# Patient Record
Sex: FEMALE | Race: WHITE | NOT HISPANIC OR LATINO | Employment: FULL TIME | URBAN - METROPOLITAN AREA
[De-identification: names, ages, dates, MRNs, and addresses within clinical notes are randomized per-mention and may not be internally consistent; named-entity substitution may affect disease eponyms.]

---

## 2022-05-05 ENCOUNTER — HOSPITAL ENCOUNTER (EMERGENCY)
Facility: HOSPITAL | Age: 27
Discharge: HOME | End: 2022-05-05
Attending: EMERGENCY MEDICINE | Admitting: EMERGENCY MEDICINE
Payer: COMMERCIAL

## 2022-05-05 VITALS
SYSTOLIC BLOOD PRESSURE: 121 MMHG | BODY MASS INDEX: 23.05 KG/M2 | HEART RATE: 79 BPM | HEIGHT: 64 IN | OXYGEN SATURATION: 97 % | RESPIRATION RATE: 16 BRPM | TEMPERATURE: 98.3 F | DIASTOLIC BLOOD PRESSURE: 70 MMHG | WEIGHT: 135 LBS

## 2022-05-05 DIAGNOSIS — N73.9 PID (PELVIC INFLAMMATORY DISEASE): Primary | ICD-10-CM

## 2022-05-05 LAB
BILIRUB UR QL STRIP: NEGATIVE
CLARITY UR: CLEAR
COLOR UR: YELLOW
EXT PREG TEST URINE: NEGATIVE
EXT. CONTROL ED NAV: NORMAL
GLUCOSE UR STRIP-MCNC: NEGATIVE MG/DL
HGB UR QL STRIP.AUTO: NEGATIVE
HIV 1+2 AB+HIV1 P24 AG SERPL QL IA: NORMAL
HIV1 P24 AG SER QL: NORMAL
KETONES UR STRIP-MCNC: NEGATIVE MG/DL
LEUKOCYTE ESTERASE UR QL STRIP: NEGATIVE
NITRITE UR QL STRIP: NEGATIVE
PH UR STRIP.AUTO: 7 [PH]
PROT UR STRIP-MCNC: NEGATIVE MG/DL
RPR SER QL: NORMAL
SP GR UR STRIP.AUTO: 1.02 (ref 1–1.03)
UROBILINOGEN UR QL STRIP.AUTO: 0.2 E.U./DL

## 2022-05-05 PROCEDURE — 86592 SYPHILIS TEST NON-TREP QUAL: CPT | Performed by: EMERGENCY MEDICINE

## 2022-05-05 PROCEDURE — 96372 THER/PROPH/DIAG INJ SC/IM: CPT

## 2022-05-05 PROCEDURE — 87806 HIV AG W/HIV1&2 ANTB W/OPTIC: CPT | Performed by: EMERGENCY MEDICINE

## 2022-05-05 PROCEDURE — 99283 EMERGENCY DEPT VISIT LOW MDM: CPT

## 2022-05-05 PROCEDURE — 36415 COLL VENOUS BLD VENIPUNCTURE: CPT | Performed by: EMERGENCY MEDICINE

## 2022-05-05 PROCEDURE — 81025 URINE PREGNANCY TEST: CPT | Performed by: EMERGENCY MEDICINE

## 2022-05-05 PROCEDURE — 99284 EMERGENCY DEPT VISIT MOD MDM: CPT | Performed by: EMERGENCY MEDICINE

## 2022-05-05 PROCEDURE — 81003 URINALYSIS AUTO W/O SCOPE: CPT | Performed by: EMERGENCY MEDICINE

## 2022-05-05 PROCEDURE — 87591 N.GONORRHOEAE DNA AMP PROB: CPT | Performed by: EMERGENCY MEDICINE

## 2022-05-05 PROCEDURE — 87491 CHLMYD TRACH DNA AMP PROBE: CPT | Performed by: EMERGENCY MEDICINE

## 2022-05-05 RX ORDER — METRONIDAZOLE 500 MG/1
500 TABLET ORAL ONCE
Status: COMPLETED | OUTPATIENT
Start: 2022-05-05 | End: 2022-05-05

## 2022-05-05 RX ORDER — METRONIDAZOLE 500 MG/1
500 TABLET ORAL EVERY 12 HOURS SCHEDULED
Qty: 28 TABLET | Refills: 0 | Status: SHIPPED | OUTPATIENT
Start: 2022-05-05 | End: 2022-05-05 | Stop reason: SDUPTHER

## 2022-05-05 RX ORDER — METRONIDAZOLE 500 MG/1
500 TABLET ORAL EVERY 12 HOURS SCHEDULED
Qty: 28 TABLET | Refills: 0 | Status: SHIPPED | OUTPATIENT
Start: 2022-05-05 | End: 2022-05-19

## 2022-05-05 RX ORDER — DOXYCYCLINE HYCLATE 100 MG/1
100 CAPSULE ORAL ONCE
Status: COMPLETED | OUTPATIENT
Start: 2022-05-05 | End: 2022-05-05

## 2022-05-05 RX ORDER — DOXYCYCLINE HYCLATE 100 MG/1
100 CAPSULE ORAL 2 TIMES DAILY
Qty: 28 CAPSULE | Refills: 0 | Status: SHIPPED | OUTPATIENT
Start: 2022-05-05 | End: 2022-05-19

## 2022-05-05 RX ORDER — ONDANSETRON 4 MG/1
4 TABLET, ORALLY DISINTEGRATING ORAL ONCE
Status: COMPLETED | OUTPATIENT
Start: 2022-05-05 | End: 2022-05-05

## 2022-05-05 RX ADMIN — ONDANSETRON 4 MG: 4 TABLET, ORALLY DISINTEGRATING ORAL at 01:08

## 2022-05-05 RX ADMIN — METRONIDAZOLE 500 MG: 500 TABLET ORAL at 01:08

## 2022-05-05 RX ADMIN — LIDOCAINE HYDROCHLORIDE 250 MG: 10 INJECTION, SOLUTION EPIDURAL; INFILTRATION; INTRACAUDAL; PERINEURAL at 01:08

## 2022-05-05 RX ADMIN — DOXYCYCLINE 100 MG: 100 CAPSULE ORAL at 01:08

## 2022-05-05 NOTE — DISCHARGE INSTRUCTIONS
No intercourse until testing is resulted and negative  If positive, you should notify your sexual partners within the last 60 days

## 2022-05-05 NOTE — ED PROVIDER NOTES
History  Chief Complaint   Patient presents with    Vaginal Pain     Pt arrived to ED from home with pain in her cervix, "shooting down"  Slept with 1 of 2 partners approx 18      30 yo F, sexually active with multiple partners, does not use protection presents to ED with vaginal pain that started after intercourse tonight  Has had in past, milder, not like this  No h/o STD in past, has had BV in past  No gyn currently  No fevers/chills/N/V  No abd pain  No vaginal bleeding or discharge or rashes that she knows of  [place] was penile penetration only, and not rough, no injury during intercourse that she knows of  No urinary complaints  History provided by:  Patient and medical records   used: No    Vaginal Pain  Severity:  Moderate  Onset quality:  Sudden  Timing:  Constant  Progression:  Unchanged  Chronicity:  Recurrent  Associated symptoms: no abdominal pain, no chest pain, no congestion, no cough, no diarrhea, no ear pain, no fatigue, no fever, no headaches, no loss of consciousness, no myalgias, no nausea, no rash, no rhinorrhea, no shortness of breath, no sore throat, no vomiting and no wheezing        None       Past Medical History:   Diagnosis Date    ADHD     Depression     Psychiatric disorder        Past Surgical History:   Procedure Laterality Date    BREAST MASS EXCISION Left 2015    FOOT SURGERY Bilateral        History reviewed  No pertinent family history  I have reviewed and agree with the history as documented      E-Cigarette/Vaping    E-Cigarette Use Current Every Day User      E-Cigarette/Vaping Substances    Nicotine Yes     THC No     CBD No     Flavoring No     Other No     Unknown No      Social History     Tobacco Use    Smoking status: Never Smoker    Smokeless tobacco: Never Used   Vaping Use    Vaping Use: Every day    Substances: Nicotine   Substance Use Topics    Alcohol use: Not Currently    Drug use: Never       Review of Systems Constitutional: Negative for appetite change, chills, fatigue and fever  HENT: Negative for congestion, ear pain, rhinorrhea, sore throat, trouble swallowing and voice change  Eyes: Negative for pain and visual disturbance  Respiratory: Negative for cough, chest tightness, shortness of breath and wheezing  Cardiovascular: Negative for chest pain, palpitations and leg swelling  Gastrointestinal: Negative for abdominal pain, blood in stool, constipation, diarrhea, nausea and vomiting  Genitourinary: Positive for vaginal pain  Negative for difficulty urinating, dysuria, flank pain, hematuria, vaginal bleeding and vaginal discharge  Musculoskeletal: Negative for back pain, myalgias, neck pain and neck stiffness  Skin: Negative for rash  Neurological: Negative for dizziness, loss of consciousness, syncope, speech difficulty, light-headedness and headaches  Psychiatric/Behavioral: Negative for confusion and suicidal ideas  Physical Exam  Physical Exam  Vitals and nursing note reviewed  Exam conducted with a chaperone present (nurse [facility])  Constitutional:       General: She is not in acute distress  Appearance: She is well-developed  She is not diaphoretic  HENT:      Head: Normocephalic and atraumatic  Right Ear: External ear normal       Left Ear: External ear normal       Nose: Nose normal    Eyes:      General: No scleral icterus  Right eye: No discharge  Left eye: No discharge  Conjunctiva/sclera: Conjunctivae normal       Pupils: Pupils are equal, round, and reactive to light  Neck:      Trachea: No tracheal deviation  Cardiovascular:      Rate and Rhythm: Normal rate and regular rhythm  Heart sounds: Normal heart sounds  No murmur heard  No friction rub  No gallop  Pulmonary:      Effort: Pulmonary effort is normal  No respiratory distress  Breath sounds: Normal breath sounds  No stridor  Chest:      Chest wall: No tenderness  Abdominal:      General: Bowel sounds are normal       Palpations: Abdomen is soft  Tenderness: There is no abdominal tenderness  There is no guarding or rebound  Genitourinary:     Comments: + CMT, with strawberry cervix  Scant yellow discharge  No lesions on vulva  No bleeding  No uterine/adnexal tenderness  Musculoskeletal:         General: No deformity  Normal range of motion  Cervical back: Normal range of motion and neck supple  Lymphadenopathy:      Cervical: No cervical adenopathy  Skin:     General: Skin is warm and dry  Findings: No rash  Neurological:      Mental Status: She is alert and oriented to person, place, and time  Cranial Nerves: No cranial nerve deficit  Sensory: No sensory deficit  Coordination: Coordination normal    Psychiatric:         Behavior: Behavior normal          Vital Signs  ED Triage Vitals [05/05/22 0048]   Temperature Pulse Respirations Blood Pressure SpO2   98 3 °F (36 8 °C) 96 16 121/70 97 %      Temp Source Heart Rate Source Patient Position - Orthostatic VS BP Location FiO2 (%)   Oral Monitor Sitting Right arm --      Pain Score       5           Vitals:    05/05/22 0048 05/05/22 0100   BP: 121/70 121/70   Pulse: 96 79   Patient Position - Orthostatic VS: Sitting          Visual Acuity      ED Medications  Medications   ondansetron (ZOFRAN-ODT) dispersible tablet 4 mg (4 mg Oral Given 5/5/22 0108)   cefTRIAXone (ROCEPHIN) 250 mg in lidocaine (PF) (XYLOCAINE-MPF) 1 % IM only syringe (250 mg Intramuscular Given 5/5/22 0108)   doxycycline hyclate (VIBRAMYCIN) capsule 100 mg (100 mg Oral Given 5/5/22 0108)   metroNIDAZOLE (FLAGYL) tablet 500 mg (500 mg Oral Given 5/5/22 0108)       Diagnostic Studies  Results Reviewed     Procedure Component Value Units Date/Time    Rapid HIV 1/2 AB-AG Combo [[id number]] Collected: 05/05/22 0116    Lab Status:  In process Specimen: Blood from Arm, Right Updated: 05/05/22 0122    RPR [[id number]] Collected: 05/05/22 0116    Lab Status: In process Specimen: Blood from Arm, Right Updated: 05/05/22 0122    UA w Reflex to Microscopic w Reflex to Culture [[id number]] Collected: 05/05/22 0049    Lab Status: Final result Specimen: Urine, Clean Catch Updated: 05/05/22 0058     Color, UA Yellow     Clarity, UA Clear     Specific Gravity, UA 1 025     pH, UA 7 0     Leukocytes, UA Negative     Nitrite, UA Negative     Protein, UA Negative mg/dl      Glucose, UA Negative mg/dl      Ketones, UA Negative mg/dl      Urobilinogen, UA 0 2 E U /dl      Bilirubin, UA Negative     Blood, UA Negative    Chlamydia/GC amplified DNA by PCR [[id number]] Collected: 05/05/22 0049    Lab Status: In process Specimen: Urine, Other Updated: 05/05/22 0054    POCT pregnancy, urine [[id number]]  (Normal) Resulted: 05/05/22 0053    Lab Status: Final result Updated: 05/05/22 0053     EXT PREG TEST UR (Ref: Negative) negative     Control valid                 No orders to display              Procedures  Procedures         ED Course                               SBIRT 22yo+      Most Recent Value   SBIRT (24 yo +)    In order to provide better care to our patients, we are screening all of our patients for alcohol and drug use  Would it be okay to ask you these screening questions? Yes Filed at: 05/05/2022 0054   Initial Alcohol Screen: US AUDIT-C     1  How often do you have a drink containing alcohol? 0 Filed at: 05/05/2022 0054   2  How many drinks containing alcohol do you have on a typical day you are drinking? 0 Filed at: 05/05/2022 0054   3b  FEMALE Any Age, or MALE 65+: How often do you have 4 or more drinks on one occassion? 0 Filed at: 05/05/2022 0054   Audit-C Score 0 Filed at: 05/05/2022 1555   YESSENIA: How many times in the past year have you    Used an illegal drug or used a prescription medication for non-medical reasons?  Never Filed at: 05/05/2022 0054                    MDM  Number of Diagnoses or Management Options  PID (pelvic inflammatory disease)  Diagnosis management comments: Will treat for presumed PID  Discussed safe sex and gyn f/u  RTED instructions given  STD cultures pend at time of d/c  Amount and/or Complexity of Data Reviewed  Clinical lab tests: ordered  Review and summarize past medical records: yes    Risk of Complications, Morbidity, and/or Mortality  Presenting problems: low  Diagnostic procedures: low  Management options: low    Patient Progress  Patient progress: stable      Disposition  Final diagnoses:   PID (pelvic inflammatory disease)     Time reflects when diagnosis was documented in both MDM as applicable and the Disposition within this note     Time User Action Codes Description Comment    5/5/2022  1:18 AM Kelly Soria [N73 [id number]] PID (pelvic inflammatory disease)       ED Disposition     ED Disposition Condition Date/Time Comment    Discharge Stable Thu May 5, 2022  1:18 AM Miley Steve discharge to home/self care              Follow-up Information     Follow up With Specialties Details Why Contact Info Additional Information     [address] Emergency Department Emergency Medicine  If symptoms worsen [address] [place], [id number]  [address] [facility] Emergency Department, [address] and Gynecology Schedule an appointment as soon as possible for a visit   [address] Obstetrics and Gynecology Call   [address] [facility] [id number] [place] [id number]  [address], [address], [phone]          Discharge Medication List as of 5/5/2022  1:21 AM      START taking these medications    Details   doxycycline hyclate (VIBRAMYCIN) 100 mg capsule Take 1 capsule (100 mg total) by mouth 2 (two) times a day for 14 days, Starting u 5/5/2022, Until u 5/19/2022, Print      metroNIDAZOLE (FLAGYL) 500 mg tablet Take 1 tablet (500 mg total) by mouth every 12 (twelve) hours for 14 days, Starting Thu 5/5/2022, Until u 5/19/2022, Print             No discharge procedures on file      PDMP Review     None          ED Provider  Electronically Signed by           Sherlie Lesches, MD  05/05/22 2721

## 2022-05-07 LAB
C TRACH DNA SPEC QL NAA+PROBE: NEGATIVE
N GONORRHOEA DNA SPEC QL NAA+PROBE: NEGATIVE

## 2024-11-15 ENCOUNTER — TELEPHONE (OUTPATIENT)
Age: 29
End: 2024-11-15

## 2024-11-15 NOTE — TELEPHONE ENCOUNTER
Wait List - MM    Cherry states she has no office preference. She will take [place], [place] or [place].    No Provider Preference

## 2025-01-16 ENCOUNTER — TELEPHONE (OUTPATIENT)
Age: 30
End: 2025-01-16

## 2025-01-24 ENCOUNTER — HOSPITAL ENCOUNTER (OUTPATIENT)
Dept: RADIOLOGY | Facility: HOSPITAL | Age: 30
End: 2025-01-24
Payer: COMMERCIAL

## 2025-01-24 DIAGNOSIS — M79.671 RIGHT FOOT PAIN: ICD-10-CM

## 2025-01-24 PROCEDURE — 73630 X-RAY EXAM OF FOOT: CPT

## 2025-02-07 NOTE — TELEPHONE ENCOUNTER
"Behavioral Health Outpatient Intake Questions    Referred By   : Self    Please advise interviewee that they need to answer all questions truthfully to allow for best care, and any misrepresentations of information may affect their ability to be seen at this clinic   => Was this discussed? Yes     If Minor Child (under age 18)    Who is/are the legal guardian(s) of the child?     Is there a custody agreement? No     If \"YES\"- Custody orders must be obtained prior to scheduling the first appointment  In addition, Consent to Treatment must be signed by all legal guardians prior to scheduling the first appointment    If \"NO\"- Consent to Treatment must be signed by all legal guardians prior to scheduling the first appointment    Behavioral Health Outpatient Intake History -     Presenting Problem (in patient's own words):     Severe depression and anxiety     Are there any communication barriers for this patient?     Yes                                               If yes, please describe barriers: ADHD  If there is a unique situation, please refer to Stef Westbrook/Nakia Arguello for final determination.    Are you taking any psychiatric medications? Yes     If \"YES\" -What are they Adderall      If \"YES\" -Who prescribes? PCP    Has the Patient previously received outpatient Talk Therapy or Medication Management from [facility]  NO       If \"YES\"- When, Where and with Whom?         If \"NO\" -Has Patient received these services elsewhere?       If \"YES\" -When, Where, and with Whom?    Has the Patient abused alcohol or other substances in the last 6 months ? No  No concerns of substance abuse are reported.     If \"YES\" -What substance, How much, How often?     If illegal substance: Refer to [facility] (for TE[facility]) or [facility].   If Alcohol in excess of 10 drinks per week:  Refer to [facility] (for [facility]) or [facility]    Legal History-     Is this treatment court ordered? No   If \"yes \"send to :  Talk " "Therapy : Send to Stef Westbrook for final determination   Med Management: Send to Dr. Lockwood for final determination     Has the Patient been convicted of a felony?  No   If \"Yes\" send to -When, What?  Talk Therapy: Send to Stef Westbrook for final determination   Med Management: Send to Dr. Lockwood for final determination     ACCEPTED as a patient Yes  If \"Yes\" Appointment Date: 4/3/2025    Referred Elsewhere? No  If “Yes” - (Where? Ex: [facility], [facility], [facility], [facility], etc.)       Name of Insurance Co:[employer]   Insurance ID#[id number]  Insurance Phone #  If ins is primary or secondary?Primary   If patient is a minor, parents information such as Name, D.O.B of guarantor.    "

## 2025-02-13 ENCOUNTER — TELEPHONE (OUTPATIENT)
Dept: PSYCHIATRY | Facility: CLINIC | Age: 30
End: 2025-02-13

## 2025-02-25 ENCOUNTER — APPOINTMENT (OUTPATIENT)
Dept: RADIOLOGY | Facility: CLINIC | Age: 30
End: 2025-02-25
Payer: OTHER MISCELLANEOUS

## 2025-02-25 ENCOUNTER — OCCMED (OUTPATIENT)
Dept: URGENT CARE | Facility: CLINIC | Age: 30
End: 2025-02-25
Payer: OTHER MISCELLANEOUS

## 2025-02-25 DIAGNOSIS — S99.911A ANKLE INJURY, RIGHT, INITIAL ENCOUNTER: ICD-10-CM

## 2025-02-25 DIAGNOSIS — S99.911A ANKLE INJURY, RIGHT, INITIAL ENCOUNTER: Primary | ICD-10-CM

## 2025-02-25 PROCEDURE — G0382 LEV 3 HOSP TYPE B ED VISIT: HCPCS

## 2025-02-25 PROCEDURE — 99283 EMERGENCY DEPT VISIT LOW MDM: CPT

## 2025-02-25 PROCEDURE — 73610 X-RAY EXAM OF ANKLE: CPT

## 2025-03-26 ENCOUNTER — APPOINTMENT (OUTPATIENT)
Dept: RADIOLOGY | Facility: HOSPITAL | Age: 30
End: 2025-03-26
Payer: COMMERCIAL

## 2025-03-26 ENCOUNTER — HOSPITAL ENCOUNTER (EMERGENCY)
Facility: HOSPITAL | Age: 30
Discharge: HOME | End: 2025-03-26
Attending: EMERGENCY MEDICINE
Payer: COMMERCIAL

## 2025-03-26 VITALS
HEIGHT: 65 IN | HEART RATE: 98 BPM | BODY MASS INDEX: 28.32 KG/M2 | RESPIRATION RATE: 18 BRPM | WEIGHT: 170 LBS | OXYGEN SATURATION: 100 % | SYSTOLIC BLOOD PRESSURE: 131 MMHG | TEMPERATURE: 98.3 F | DIASTOLIC BLOOD PRESSURE: 80 MMHG

## 2025-03-26 DIAGNOSIS — H66.91 RIGHT OTITIS MEDIA: Primary | ICD-10-CM

## 2025-03-26 LAB
FLUAV RNA RESP QL NAA+PROBE: NEGATIVE
FLUBV RNA RESP QL NAA+PROBE: NEGATIVE
RSV RNA RESP QL NAA+PROBE: NEGATIVE
SARS-COV-2 RNA RESP QL NAA+PROBE: NEGATIVE

## 2025-03-26 PROCEDURE — 99282 EMERGENCY DEPT VISIT SF MDM: CPT

## 2025-03-26 PROCEDURE — 99284 EMERGENCY DEPT VISIT MOD MDM: CPT | Performed by: EMERGENCY MEDICINE

## 2025-03-26 PROCEDURE — 0241U HB NFCT DS VIR RESP RNA 4 TRGT: CPT

## 2025-03-26 RX ORDER — AMOXICILLIN 500 MG/1
500 CAPSULE ORAL EVERY 12 HOURS SCHEDULED
Qty: 14 CAPSULE | Refills: 0 | Status: SHIPPED | OUTPATIENT
Start: 2025-03-26 | End: 2025-04-02

## 2025-03-26 RX ORDER — AMOXICILLIN 250 MG/1
500 CAPSULE ORAL ONCE
Status: COMPLETED | OUTPATIENT
Start: 2025-03-26 | End: 2025-03-26

## 2025-03-26 RX ORDER — OXYMETAZOLINE HYDROCHLORIDE 0.05 G/100ML
2 SPRAY NASAL ONCE
Status: COMPLETED | OUTPATIENT
Start: 2025-03-26 | End: 2025-03-26

## 2025-03-26 RX ADMIN — OXYMETAZOLINE HYDROCHLORIDE 2 SPRAY: 0.05 SPRAY NASAL at 22:34

## 2025-03-26 RX ADMIN — AMOXICILLIN 500 MG: 250 CAPSULE ORAL at 22:33

## 2025-03-27 NOTE — DISCHARGE INSTRUCTIONS
You have been seen for right ear pain. Please complete the course of amoxicillin as prescribed. Return to the emergency department if you develop worsening pain, headaches, fevers or any other symptoms of concern. Please follow up with a PCP and ENT by calling the number provided.

## 2025-03-27 NOTE — ED PROVIDER NOTES
Time reflects when diagnosis was documented in both MDM as applicable and the Disposition within this note       Time User Action Codes Description Comment    3/26/2025 10:32 PM Denver Jones Add [H66.91] Right otitis media           ED Disposition       ED Disposition   Discharge    Condition   Stable    Date/Time   Wed Mar 26, 2025 10:29 PM    Comment   Cherry Oakes discharge to home/self care.                   Assessment & Plan       Medical Decision Making    29 y.o. female presenting for right ear pain.  VSS, afebrile.  No s/s dental or neck space abscess on exam,  Right TM erythematous and bulging, suspect AOM. No mastoid tenderness, do not suspect mastoiditis.  Will treat with afrin nasal spray and course of amoxicillin.    I have discussed with the patient our plan to discharge them from the ED and the patient is in agreement with this plan. The patient was provided a written after visit summary with strict RTED precautions.     Followup: I have discussed with the patient plan to follow up with a PCP. Will provide info for evaluation by ENT as well should symptoms fail to resolve. Contact information provided in AVS.    Risk  OTC drugs.  Prescription drug management.             Medications   amoxicillin (AMOXIL) capsule 500 mg (500 mg Oral Given 3/26/25 2233)   oxymetazoline (AFRIN) 0.05 % nasal spray 2 spray (2 sprays Each Nare Given 3/26/25 2234)       ED Risk Strat Scores                                                History of Present Illness       Chief Complaint   Patient presents with    Earache     Pt states that for the past 12 days she has been having right ear pain the travels into the neck and head and jaw area. Pt took motrin at noon       Past Medical History:   Diagnosis Date    ADHD     Depression     Psychiatric disorder       Past Surgical History:   Procedure Laterality Date    BREAST MASS EXCISION Left 2015    FOOT SURGERY Bilateral       History reviewed. No pertinent family  history.   Social History     Tobacco Use    Smoking status: Never    Smokeless tobacco: Current   Vaping Use    Vaping status: Every Day    Substances: Nicotine   Substance Use Topics    Alcohol use: Not Currently    Drug use: Never      E-Cigarette/Vaping    E-Cigarette Use Current Every Day User       E-Cigarette/Vaping Substances    Nicotine Yes     THC No     CBD No     Flavoring No     Other No     Unknown No       I have reviewed and agree with the history as documented.     Cherry Oakes is a 29 y.o. year old female presenting to the [facility] ED for right ear pain. Patient reporting two weeks of constant right ear discomfort. She occasionally has a pressure sensation in her right ear which radiates into her right and left jaw. She also feels a pressure sensation in her head.  No fevers, cough or congestion. No neck stiffness or swelling. No double vision or loss of vision. No chest pain or dyspnea.  She was seen by her dentist who did not see any abnormalities to explain her symptoms.  She has taken motrin/naproxen without relief. She does not take OCP medications.      History provided by:  Medical records and patient   used: No    Earache  Associated symptoms: headaches    Associated symptoms: no abdominal pain, no congestion, no cough, no fever, no neck pain, no rash, no rhinorrhea, no sore throat and no vomiting        Review of Systems   Constitutional:  Negative for chills and fever.   HENT:  Positive for dental problem and ear pain. Negative for congestion, drooling, rhinorrhea, sinus pressure, sore throat, trouble swallowing and voice change.    Eyes:  Negative for photophobia and visual disturbance.   Respiratory:  Negative for cough and shortness of breath.    Cardiovascular:  Negative for chest pain.   Gastrointestinal:  Negative for abdominal pain, nausea and vomiting.   Musculoskeletal:  Negative for neck pain and neck stiffness.   Skin:  Negative for rash.   Neurological:   Positive for headaches. Negative for weakness and numbness.   All other systems reviewed and are negative.          Objective       ED Triage Vitals [03/26/25 2201]   Temperature Pulse Blood Pressure Respirations SpO2 Patient Position - Orthostatic VS   98.3 °F (36.8 °C) 98 131/80 18 100 % --      Temp src Heart Rate Source BP Location FiO2 (%) Pain Score    -- Monitor -- -- --      Vitals      Date and Time Temp Pulse SpO2 Resp BP Pain Score FACES Pain Rating User   03/26/25 2201 98.3 °F (36.8 °C) 98 100 % 18 131/80 -- -- LD            Physical Exam  Vitals and nursing note reviewed.   Constitutional:       General: She is not in acute distress.     Appearance: Normal appearance. She is well-developed. She is not ill-appearing, toxic-appearing or diaphoretic.   HENT:      Head: Normocephalic and atraumatic.      Right Ear: Ear canal and external ear normal. No drainage or tenderness. A middle ear effusion is present. There is no impacted cerumen. No foreign body. No mastoid tenderness. Tympanic membrane is erythematous, retracted and bulging.      Left Ear: Tympanic membrane, ear canal and external ear normal.      Nose: No congestion or rhinorrhea.      Mouth/Throat:      Pharynx: Uvula midline. No pharyngeal swelling, oropharyngeal exudate, posterior oropharyngeal erythema or uvula swelling.      Tonsils: No tonsillar exudate or tonsillar abscesses.   Eyes:      General:         Right eye: No discharge.         Left eye: No discharge.   Cardiovascular:      Rate and Rhythm: Normal rate and regular rhythm.   Pulmonary:      Effort: Pulmonary effort is normal. No accessory muscle usage or respiratory distress.      Breath sounds: Normal breath sounds. No stridor. No decreased breath sounds, wheezing, rhonchi or rales.   Abdominal:      General: There is no distension.      Palpations: Abdomen is soft.      Tenderness: There is no abdominal tenderness. There is no guarding or rebound.   Musculoskeletal:       Cervical back: Normal range of motion and neck supple. No rigidity or tenderness.      Right lower leg: No tenderness.      Left lower leg: No tenderness.   Skin:     Capillary Refill: Capillary refill takes less than 2 seconds.   Neurological:      Mental Status: She is alert and oriented to person, place, and time.      GCS: GCS eye subscore is 4. GCS verbal subscore is 5. GCS motor subscore is 6.      Cranial Nerves: No dysarthria or facial asymmetry.      Sensory: Sensation is intact.      Motor: Motor function is intact.      Comments: 5/5 strength b/l UE/LE.  Sensation grossly intact throughout.     Psychiatric:         Mood and Affect: Mood normal.         Behavior: Behavior normal.         Results Reviewed       Procedure Component Value Units Date/Time    COVID/FLU/RSV [[id number]]  (Normal) Collected: 03/26/25 2205    Lab Status: Final result Specimen: Nares from Nose Updated: 03/26/25 2246     SARS-CoV-2 Negative     INFLUENZA A PCR Negative     INFLUENZA B PCR Negative     RSV PCR Negative    Narrative:      This test has been performed using the CoV-2/Flu/RSV plus assay on the [employer] GeneXpert platform. This test has been validated by the  and verified by the performing laboratory.     This test is designed to amplify and detect the following: nucleocapsid (N), envelope (E), and RNA-dependent RNA polymerase (RdRP) genes of the SARS-CoV-2 genome; matrix (M), basic polymerase (PB2), and acidic protein (PA) segments of the influenza A genome; matrix (M) and non-structural protein (NS) segments of the influenza B genome, and the nucleocapsid genes of RSV A and RSV B.     Positive results are indicative of the presence of Flu A, Flu B, RSV, and/or SARS-CoV-2 RNA. Positive results for SARS-CoV-2 or suspected novel influenza should be reported to state, local, or federal health departments according to local reporting requirements.      All results should be assessed in conjunction with clinical  presentation and other laboratory markers for clinical management.     FOR PEDIATRIC PATIENTS - copy/paste COVID Guidelines URL to browser: [email or web address]               No orders to display       Procedures    ED Medication and Procedure Management   None     Discharge Medication List as of 3/26/2025 10:34 PM        START taking these medications    Details   amoxicillin (AMOXIL) 500 mg capsule Take 1 capsule (500 mg total) by mouth every 12 (twelve) hours for 7 days, Starting Wed 3/26/2025, Until Wed 4/2/2025, Normal             ED SEPSIS DOCUMENTATION   Time reflects when diagnosis was documented in both MDM as applicable and the Disposition within this note       Time User Action Codes Description Comment    3/26/2025 10:32 PM Denver Jones Add [H66.91] Right otitis media                  Denver Jones, DO  03/26/25 2335       Denver Jones, DO  03/26/25 2313

## 2025-04-03 ENCOUNTER — TELEPHONE (OUTPATIENT)
Dept: PSYCHIATRY | Facility: CLINIC | Age: 30
End: 2025-04-03

## 2025-04-03 NOTE — TELEPHONE ENCOUNTER
LVM that client still needs to sign several forms in order to have her virtual NP appt at 3PM today. These forms include the Insurance Auth, Financial Policy, NP Forms, and the Virtual Care Behavioral Health Consent. Made client aware that these forms are required to procced with her appt.

## 2025-06-13 ENCOUNTER — APPOINTMENT (EMERGENCY)
Dept: ULTRASOUND IMAGING | Facility: HOSPITAL | Age: 30
End: 2025-06-13
Payer: COMMERCIAL

## 2025-06-13 ENCOUNTER — HOSPITAL ENCOUNTER (EMERGENCY)
Facility: HOSPITAL | Age: 30
Discharge: HOME | End: 2025-06-13
Attending: EMERGENCY MEDICINE
Payer: COMMERCIAL

## 2025-06-13 VITALS
TEMPERATURE: 97.8 F | WEIGHT: 180 LBS | RESPIRATION RATE: 18 BRPM | OXYGEN SATURATION: 100 % | HEART RATE: 94 BPM | BODY MASS INDEX: 29.99 KG/M2 | SYSTOLIC BLOOD PRESSURE: 100 MMHG | HEIGHT: 65 IN | DIASTOLIC BLOOD PRESSURE: 63 MMHG

## 2025-06-13 DIAGNOSIS — N94.6 DYSMENORRHEA: Primary | ICD-10-CM

## 2025-06-13 LAB
ALBUMIN SERPL BCG-MCNC: 4.5 G/DL (ref 3.5–5)
ALP SERPL-CCNC: 39 U/L (ref 34–104)
ALT SERPL W P-5'-P-CCNC: 20 U/L (ref 7–52)
ANION GAP SERPL CALCULATED.3IONS-SCNC: 4 MMOL/L (ref 4–13)
AST SERPL W P-5'-P-CCNC: 15 U/L (ref 13–39)
B-HCG SERPL-ACNC: <0.6 MIU/ML (ref 0–5)
BACTERIA UR QL AUTO: ABNORMAL /HPF
BASOPHILS # BLD AUTO: 0.06 THOUSANDS/ÂΜL (ref 0–0.1)
BASOPHILS NFR BLD AUTO: 1 % (ref 0–1)
BILIRUB SERPL-MCNC: 0.33 MG/DL (ref 0.2–1)
BILIRUB UR QL STRIP: NEGATIVE
BUN SERPL-MCNC: 15 MG/DL (ref 5–25)
CALCIUM SERPL-MCNC: 9.3 MG/DL (ref 8.4–10.2)
CHLORIDE SERPL-SCNC: 106 MMOL/L (ref 96–108)
CLARITY UR: ABNORMAL
CO2 SERPL-SCNC: 26 MMOL/L (ref 21–32)
COLOR UR: YELLOW
CREAT SERPL-MCNC: 0.6 MG/DL (ref 0.6–1.3)
EOSINOPHIL # BLD AUTO: 0.08 THOUSAND/ÂΜL (ref 0–0.61)
EOSINOPHIL NFR BLD AUTO: 1 % (ref 0–6)
ERYTHROCYTE [DISTWIDTH] IN BLOOD BY AUTOMATED COUNT: 11.9 % (ref 11.6–15.1)
GFR SERPL CREATININE-BSD FRML MDRD: 122 ML/MIN/1.73SQ M
GLUCOSE SERPL-MCNC: 103 MG/DL (ref 65–140)
GLUCOSE UR STRIP-MCNC: NEGATIVE MG/DL
HCT VFR BLD AUTO: 39.9 % (ref 34.8–46.1)
HGB BLD-MCNC: 13.8 G/DL (ref 11.5–15.4)
HGB UR QL STRIP.AUTO: ABNORMAL
IMM GRANULOCYTES # BLD AUTO: 0.02 THOUSAND/UL (ref 0–0.2)
IMM GRANULOCYTES NFR BLD AUTO: 0 % (ref 0–2)
KETONES UR STRIP-MCNC: NEGATIVE MG/DL
LEUKOCYTE ESTERASE UR QL STRIP: ABNORMAL
LIPASE SERPL-CCNC: 16 U/L (ref 11–82)
LYMPHOCYTES # BLD AUTO: 2 THOUSANDS/ÂΜL (ref 0.6–4.47)
LYMPHOCYTES NFR BLD AUTO: 29 % (ref 14–44)
MCH RBC QN AUTO: 31.5 PG (ref 26.8–34.3)
MCHC RBC AUTO-ENTMCNC: 34.6 G/DL (ref 31.4–37.4)
MCV RBC AUTO: 91 FL (ref 82–98)
MONOCYTES # BLD AUTO: 0.41 THOUSAND/ÂΜL (ref 0.17–1.22)
MONOCYTES NFR BLD AUTO: 6 % (ref 4–12)
MUCOUS THREADS UR QL AUTO: ABNORMAL
NEUTROPHILS # BLD AUTO: 4.29 THOUSANDS/ÂΜL (ref 1.85–7.62)
NEUTS SEG NFR BLD AUTO: 63 % (ref 43–75)
NITRITE UR QL STRIP: NEGATIVE
NON-SQ EPI CELLS URNS QL MICRO: ABNORMAL /HPF
NRBC BLD AUTO-RTO: 0 /100 WBCS
PH UR STRIP.AUTO: 6 [PH]
PLATELET # BLD AUTO: 241 THOUSANDS/UL (ref 149–390)
PMV BLD AUTO: 10.2 FL (ref 8.9–12.7)
POTASSIUM SERPL-SCNC: 3.8 MMOL/L (ref 3.5–5.3)
PROT SERPL-MCNC: 6.9 G/DL (ref 6.4–8.4)
PROT UR STRIP-MCNC: ABNORMAL MG/DL
RBC # BLD AUTO: 4.38 MILLION/UL (ref 3.81–5.12)
RBC #/AREA URNS AUTO: ABNORMAL /HPF
SODIUM SERPL-SCNC: 136 MMOL/L (ref 135–147)
SP GR UR STRIP.AUTO: 1.02 (ref 1–1.03)
UROBILINOGEN UR STRIP-ACNC: <2 MG/DL
WBC # BLD AUTO: 6.86 THOUSAND/UL (ref 4.31–10.16)
WBC #/AREA URNS AUTO: ABNORMAL /HPF

## 2025-06-13 PROCEDURE — 96372 THER/PROPH/DIAG INJ SC/IM: CPT

## 2025-06-13 PROCEDURE — 99284 EMERGENCY DEPT VISIT MOD MDM: CPT

## 2025-06-13 PROCEDURE — 80053 COMPREHEN METABOLIC PANEL: CPT

## 2025-06-13 PROCEDURE — 76856 US EXAM PELVIC COMPLETE: CPT

## 2025-06-13 PROCEDURE — 76830 TRANSVAGINAL US NON-OB: CPT

## 2025-06-13 PROCEDURE — 99284 EMERGENCY DEPT VISIT MOD MDM: CPT | Performed by: PHYSICIAN ASSISTANT

## 2025-06-13 PROCEDURE — 83690 ASSAY OF LIPASE: CPT

## 2025-06-13 PROCEDURE — 81001 URINALYSIS AUTO W/SCOPE: CPT | Performed by: PHYSICIAN ASSISTANT

## 2025-06-13 PROCEDURE — 85025 COMPLETE CBC W/AUTO DIFF WBC: CPT

## 2025-06-13 PROCEDURE — 36415 COLL VENOUS BLD VENIPUNCTURE: CPT

## 2025-06-13 PROCEDURE — 84702 CHORIONIC GONADOTROPIN TEST: CPT

## 2025-06-13 RX ORDER — KETOROLAC TROMETHAMINE 30 MG/ML
30 INJECTION, SOLUTION INTRAMUSCULAR; INTRAVENOUS ONCE
Status: COMPLETED | OUTPATIENT
Start: 2025-06-13 | End: 2025-06-13

## 2025-06-13 RX ORDER — KETOROLAC TROMETHAMINE 30 MG/ML
15 INJECTION, SOLUTION INTRAMUSCULAR; INTRAVENOUS ONCE
Status: DISCONTINUED | OUTPATIENT
Start: 2025-06-13 | End: 2025-06-13

## 2025-06-13 RX ORDER — NAPROXEN 500 MG/1
500 TABLET ORAL 2 TIMES DAILY WITH MEALS
Qty: 30 TABLET | Refills: 0 | Status: SHIPPED | OUTPATIENT
Start: 2025-06-13

## 2025-06-13 RX ADMIN — KETOROLAC TROMETHAMINE 30 MG: 30 INJECTION, SOLUTION INTRAMUSCULAR; INTRAVENOUS at 14:22

## 2025-06-13 NOTE — ED PROVIDER NOTES
Time reflects when diagnosis was documented in both MDM as applicable and the Disposition within this note       Time User Action Codes Description Comment    6/13/2025  3:12 PM Brittany Penaloza Add [N94.6] Dysmenorrhea           ED Disposition       ED Disposition   Discharge    Condition   Stable    Date/Time   Fri Jun 13, 2025  3:12 PM    Comment   Cherry Oakes discharge to home/self care.                   Assessment & Plan       Medical Decision Making  Patient with lower abdominal cramping, currently has menses, will order labs, U/S to r/o anemia, ovarian cyst/torsion.  BHCG negative, no concern for ectopic.  No acute abnormalities on labs or u/s, patient's pain improved, will d/c with naprosyn and referral to ob/gyn.     Amount and/or Complexity of Data Reviewed  Radiology: ordered.    Risk  Prescription drug management.        ED Course as of 06/13/25 1531   Fri Jun 13, 2025   1520 Patient's pain improved with toradol.         Medications   ketorolac (TORADOL) injection 30 mg (30 mg Intramuscular Given 6/13/25 1422)       ED Risk Strat Scores                    No data recorded        SBIRT 20yo+      Flowsheet Row Most Recent Value   Initial Alcohol Screen: US AUDIT-C     1. How often do you have a drink containing alcohol? 0 Filed at: 06/13/2025 1426   2. How many drinks containing alcohol do you have on a typical day you are drinking?  0 Filed at: 06/13/2025 1426   3a. Male UNDER 65: How often do you have five or more drinks on one occasion? 0 Filed at: 06/13/2025 1426   3b. FEMALE Any Age, or MALE 65+: How often do you have 4 or more drinks on one occassion? 0 Filed at: 06/13/2025 1426   Audit-C Score 0 Filed at: 06/13/2025 1426   YESSENIA: How many times in the past year have you...    Used an illegal drug or used a prescription medication for non-medical reasons? Never Filed at: 06/13/2025 1426                            History of Present Illness       Chief Complaint   Patient presents with     Vaginal Bleeding     Pt states that she started her menstruation on Tuesday and is having lower mid/adb pain. Pt took midal at 10am this morning        Past Medical History[1]   Past Surgical History[2]   Family History[3]   Social History[4]   E-Cigarette/Vaping    E-Cigarette Use Current Every Day User       E-Cigarette/Vaping Substances    Nicotine Yes     THC No     CBD No     Flavoring No     Other No     Unknown No       I have reviewed and agree with the history as documented.     Patient is a 28 y/o F that presents to the ED with abdominal cramping.  She states her period started 4 days ago at normal time.  She states her cramping has worsened.  She took midol this morning, but it didn't help the pain.  She denies heavy vaginal bleeding and states her period has stopped.  No nausea, vomiting or diarrhea.       History provided by:  Patient  Vaginal Bleeding  Associated symptoms: abdominal pain    Associated symptoms: no back pain, no dizziness, no dysuria, no fever and no nausea        Review of Systems   Constitutional:  Negative for chills and fever.   HENT: Negative.     Respiratory:  Negative for cough and shortness of breath.    Cardiovascular:  Negative for chest pain.   Gastrointestinal:  Positive for abdominal pain. Negative for diarrhea, nausea and vomiting.   Genitourinary:  Positive for vaginal bleeding. Negative for dysuria and frequency.   Musculoskeletal:  Negative for back pain and neck pain.   Skin:  Negative for color change, pallor and rash.   Neurological:  Negative for dizziness, weakness, light-headedness and numbness.   Psychiatric/Behavioral:  Negative for confusion.    All other systems reviewed and are negative.          Objective       ED Triage Vitals [06/13/25 1344]   Temperature Pulse Blood Pressure Respirations SpO2 Patient Position - Orthostatic VS   97.8 °F (36.6 °C) 94 100/63 18 100 % --      Temp Source Heart Rate Source BP Location FiO2 (%) Pain Score    Temporal -- -- --  --      Vitals      Date and Time Temp Pulse SpO2 Resp BP Pain Score FACES Pain Rating User   06/13/25 1344 97.8 °F (36.6 °C) 94 100 % 18 100/63 -- -- LD            Physical Exam  Vitals and nursing note reviewed.   Constitutional:       General: She is in acute distress (patient appears to be in moderate amount of pain.).      Appearance: Normal appearance. She is well-developed and well-groomed. She is not ill-appearing or diaphoretic.   HENT:      Head: Normocephalic and atraumatic.      Right Ear: Hearing normal.      Left Ear: Hearing normal.      Nose: Nose normal.     Eyes:      Conjunctiva/sclera: Conjunctivae normal.       Cardiovascular:      Rate and Rhythm: Normal rate and regular rhythm.      Heart sounds: Normal heart sounds.   Pulmonary:      Effort: Pulmonary effort is normal.      Breath sounds: Normal breath sounds. No wheezing, rhonchi or rales.   Abdominal:      General: Abdomen is flat. Bowel sounds are normal.      Palpations: Abdomen is soft.      Tenderness: There is no abdominal tenderness.     Musculoskeletal:         General: Normal range of motion.      Right lower leg: No edema.      Left lower leg: No edema.     Skin:     General: Skin is warm and dry.      Coloration: Skin is not jaundiced or pale.      Findings: No rash.     Neurological:      General: No focal deficit present.      Mental Status: She is alert and oriented to person, place, and time.      Motor: No weakness.     Psychiatric:         Mood and Affect: Mood normal.         Behavior: Behavior is cooperative.         Results Reviewed       Procedure Component Value Units Date/Time    Urine Microscopic [[id number]]  (Abnormal) Collected: 06/13/25 1414    Lab Status: Final result Specimen: Urine, Clean Catch Updated: 06/13/25 1451     RBC, UA 4-10 /hpf      WBC, UA 2-4 /hpf      Epithelial Cells Occasional /hpf      Bacteria, UA Occasional /hpf      MUCUS THREADS Innumerable    UA w Reflex to Microscopic w Reflex to Culture  [[id number]]  (Abnormal) Collected: 06/13/25 1414    Lab Status: Final result Specimen: Urine, Clean Catch Updated: 06/13/25 1451     Color, UA Yellow     Clarity, UA Slightly Cloudy     Specific Gravity, UA 1.025     pH, UA 6.0     Leukocytes, UA Large     Nitrite, UA Negative     Protein, UA Trace mg/dl      Glucose, UA Negative mg/dl      Ketones, UA Negative mg/dl      Urobilinogen, UA <2.0 mg/dl      Bilirubin, UA Negative     Occult Blood, UA Moderate    hCG, quantitative, pregnancy [[id number]]  (Normal) Collected: 06/13/25 1346    Lab Status: Final result Specimen: Blood from Arm, Right Updated: 06/13/25 1418     HCG, Quant <0.6 mIU/mL     Narrative:       Expected Ranges:    HCG results between 5.0 and 25.0 mIU/mL may be indicative of early pregnancy but should be interpreted in light of the total clinical presentation.    HCG can rise to detectable levels in shanel and post menopausal women (0-11.6 mIU/mL).     Approximate               Approximate HCG  Gestation age          Concentration ( mIU/mL)  _____________          ______________________   Weeks                      HCG values  0.2-1                       5-50  1-2                           2-3                         100-5000  3-4                         500-33442  4-5                         1000-11334  5-6                         [id number]  6-8                         [id number]  8-12                        [id number]      Comprehensive metabolic panel [[id number]] Collected: 06/13/25 1346    Lab Status: Final result Specimen: Blood from Arm, Right Updated: 06/13/25 1409     Sodium 136 mmol/L      Potassium 3.8 mmol/L      Chloride 106 mmol/L      CO2 26 mmol/L      ANION GAP 4 mmol/L      BUN 15 mg/dL      Creatinine 0.60 mg/dL      Glucose 103 mg/dL      Calcium 9.3 mg/dL      AST 15 U/L      ALT 20 U/L      Alkaline Phosphatase 39 U/L      Total Protein 6.9 g/dL      Albumin 4.5 g/dL      Total Bilirubin 0.33 mg/dL      eGFR 122  ml/min/1.73sq m     Narrative:      [employer] guidelines for Chronic Kidney Disease (CKD):     Stage 1 with normal or high GFR (GFR > 90 mL/min/1.73 square meters)    Stage 2 Mild CKD (GFR = 60-89 mL/min/1.73 square meters)    Stage 3A Moderate CKD (GFR = 45-59 mL/min/1.73 square meters)    Stage 3B Moderate CKD (GFR = 30-44 mL/min/1.73 square meters)    Stage 4 Severe CKD (GFR = 15-29 mL/min/1.73 square meters)    Stage 5 End Stage CKD (GFR <15 mL/min/1.73 square meters)  Note: GFR calculation is accurate only with a steady state creatinine    Lipase [[id number]]  (Normal) Collected: 06/13/25 1346    Lab Status: Final result Specimen: Blood from Arm, Right Updated: 06/13/25 1409     Lipase 16 u/L     CBC and differential [[id number]] Collected: 06/13/25 1346    Lab Status: Final result Specimen: Blood from Arm, Right Updated: 06/13/25 1355     WBC 6.86 Thousand/uL      RBC 4.38 Million/uL      Hemoglobin 13.8 g/dL      Hematocrit 39.9 %      MCV 91 fL      MCH 31.5 pg      MCHC 34.6 g/dL      RDW 11.9 %      MPV 10.2 fL      Platelets 241 Thousands/uL      nRBC 0 /100 WBCs      Segmented % 63 %      Immature Grans % 0 %      Lymphocytes % 29 %      Monocytes % 6 %      Eosinophils Relative 1 %      Basophils Relative 1 %      Absolute Neutrophils 4.29 Thousands/µL      Absolute Immature Grans 0.02 Thousand/uL      Absolute Lymphocytes 2.00 Thousands/µL      Absolute Monocytes 0.41 Thousand/µL      Eosinophils Absolute 0.08 Thousand/µL      Basophils Absolute 0.06 Thousands/µL             US pelvis complete w transvaginal   Final Interpretation by Jose De La Cruz MD (06/13 5331)      Normal/physiologic transabdominal/transvaginal pelvic ultrasound                           Workstation performed: [id number]             Procedures    ED Medication and Procedure Management   None     Discharge Medication List as of 6/13/2025  3:14 PM        START taking these medications    Details   naproxen  (Naprosyn) 500 mg tablet Take 1 tablet (500 mg total) by mouth 2 (two) times a day with meals, Starting Fri 6/13/2025, Normal           No discharge procedures on file.  ED SEPSIS DOCUMENTATION   Time reflects when diagnosis was documented in both MDM as applicable and the Disposition within this note       Time User Action Codes Description Comment    6/13/2025  3:12 PM Brittany Penaloza [N94.6] Dysmenorrhea                    Brittany Penaloza PA-C  06/13/25 1525         [1]   Past Medical History:  Diagnosis Date    ADHD     Depression     Psychiatric disorder    [2]   Past Surgical History:  Procedure Laterality Date    BREAST MASS EXCISION Left 2015    FOOT SURGERY Bilateral    [3] No family history on file.  [4]   Social History  Tobacco Use    Smoking status: Never    Smokeless tobacco: Current   Vaping Use    Vaping status: Every Day    Substances: Nicotine   Substance Use Topics    Alcohol use: Not Currently    Drug use: Never        Brittany Penaloza PA-C  06/13/25 1531

## 2025-06-13 NOTE — DISCHARGE INSTRUCTIONS
Take naprosyn twice a day for pain.  Heating pad to abdomen.  Follow up with ob/gyn for recheck.  Return to ER if symptoms worsen.

## 2025-07-16 ENCOUNTER — APPOINTMENT (EMERGENCY)
Dept: CT IMAGING | Facility: HOSPITAL | Age: 30
End: 2025-07-16
Payer: COMMERCIAL

## 2025-07-16 ENCOUNTER — HOSPITAL ENCOUNTER (EMERGENCY)
Facility: HOSPITAL | Age: 30
Discharge: HOME | End: 2025-07-16
Attending: EMERGENCY MEDICINE
Payer: COMMERCIAL

## 2025-07-16 VITALS
TEMPERATURE: 98.8 F | RESPIRATION RATE: 18 BRPM | SYSTOLIC BLOOD PRESSURE: 108 MMHG | DIASTOLIC BLOOD PRESSURE: 52 MMHG | HEART RATE: 75 BPM | OXYGEN SATURATION: 100 %

## 2025-07-16 DIAGNOSIS — R51.9 HEADACHE: ICD-10-CM

## 2025-07-16 DIAGNOSIS — M54.2 NECK PAIN: Primary | ICD-10-CM

## 2025-07-16 LAB
ALBUMIN SERPL BCG-MCNC: 4.5 G/DL (ref 3.5–5)
ALP SERPL-CCNC: 39 U/L (ref 34–104)
ALT SERPL W P-5'-P-CCNC: 29 U/L (ref 7–52)
ANION GAP SERPL CALCULATED.3IONS-SCNC: 8 MMOL/L (ref 4–13)
AST SERPL W P-5'-P-CCNC: 19 U/L (ref 13–39)
BASOPHILS # BLD AUTO: 0.05 THOUSANDS/ÂΜL (ref 0–0.1)
BASOPHILS NFR BLD AUTO: 1 % (ref 0–1)
BILIRUB SERPL-MCNC: 0.58 MG/DL (ref 0.2–1)
BUN SERPL-MCNC: 11 MG/DL (ref 5–25)
CALCIUM SERPL-MCNC: 9.7 MG/DL (ref 8.4–10.2)
CARDIAC TROPONIN I PNL SERPL HS: <2 NG/L (ref ?–50)
CARDIAC TROPONIN I PNL SERPL HS: <2 NG/L (ref ?–50)
CHLORIDE SERPL-SCNC: 106 MMOL/L (ref 96–108)
CO2 SERPL-SCNC: 22 MMOL/L (ref 21–32)
CREAT SERPL-MCNC: 0.52 MG/DL (ref 0.6–1.3)
EOSINOPHIL # BLD AUTO: 0.05 THOUSAND/ÂΜL (ref 0–0.61)
EOSINOPHIL NFR BLD AUTO: 1 % (ref 0–6)
ERYTHROCYTE [DISTWIDTH] IN BLOOD BY AUTOMATED COUNT: 12 % (ref 11.6–15.1)
EXT PREGNANCY TEST URINE: NEGATIVE
EXT. CONTROL: NORMAL
GFR SERPL CREATININE-BSD FRML MDRD: 127 ML/MIN/1.73SQ M
GLUCOSE SERPL-MCNC: 96 MG/DL (ref 65–140)
HCT VFR BLD AUTO: 44.7 % (ref 34.8–46.1)
HGB BLD-MCNC: 14.7 G/DL (ref 11.5–15.4)
IMM GRANULOCYTES # BLD AUTO: 0.03 THOUSAND/UL (ref 0–0.2)
IMM GRANULOCYTES NFR BLD AUTO: 0 % (ref 0–2)
LYMPHOCYTES # BLD AUTO: 2.22 THOUSANDS/ÂΜL (ref 0.6–4.47)
LYMPHOCYTES NFR BLD AUTO: 26 % (ref 14–44)
MCH RBC QN AUTO: 31.1 PG (ref 26.8–34.3)
MCHC RBC AUTO-ENTMCNC: 32.9 G/DL (ref 31.4–37.4)
MCV RBC AUTO: 95 FL (ref 82–98)
MONOCYTES # BLD AUTO: 0.48 THOUSAND/ÂΜL (ref 0.17–1.22)
MONOCYTES NFR BLD AUTO: 6 % (ref 4–12)
NEUTROPHILS # BLD AUTO: 5.79 THOUSANDS/ÂΜL (ref 1.85–7.62)
NEUTS SEG NFR BLD AUTO: 66 % (ref 43–75)
NRBC BLD AUTO-RTO: 0 /100 WBCS
PLATELET # BLD AUTO: 246 THOUSANDS/UL (ref 149–390)
PMV BLD AUTO: 10.3 FL (ref 8.9–12.7)
POTASSIUM SERPL-SCNC: 3.8 MMOL/L (ref 3.5–5.3)
PROT SERPL-MCNC: 7.2 G/DL (ref 6.4–8.4)
RBC # BLD AUTO: 4.73 MILLION/UL (ref 3.81–5.12)
SODIUM SERPL-SCNC: 136 MMOL/L (ref 135–147)
WBC # BLD AUTO: 8.62 THOUSAND/UL (ref 4.31–10.16)

## 2025-07-16 PROCEDURE — 81025 URINE PREGNANCY TEST: CPT

## 2025-07-16 PROCEDURE — 70496 CT ANGIOGRAPHY HEAD: CPT

## 2025-07-16 PROCEDURE — 84484 ASSAY OF TROPONIN QUANT: CPT

## 2025-07-16 PROCEDURE — 70498 CT ANGIOGRAPHY NECK: CPT

## 2025-07-16 PROCEDURE — 85025 COMPLETE CBC W/AUTO DIFF WBC: CPT

## 2025-07-16 PROCEDURE — 96374 THER/PROPH/DIAG INJ IV PUSH: CPT

## 2025-07-16 PROCEDURE — 93005 ELECTROCARDIOGRAM TRACING: CPT

## 2025-07-16 PROCEDURE — 80053 COMPREHEN METABOLIC PANEL: CPT

## 2025-07-16 PROCEDURE — 86618 LYME DISEASE ANTIBODY: CPT

## 2025-07-16 PROCEDURE — 36415 COLL VENOUS BLD VENIPUNCTURE: CPT

## 2025-07-16 PROCEDURE — 99285 EMERGENCY DEPT VISIT HI MDM: CPT

## 2025-07-16 PROCEDURE — 99284 EMERGENCY DEPT VISIT MOD MDM: CPT

## 2025-07-16 RX ORDER — KETOROLAC TROMETHAMINE 30 MG/ML
15 INJECTION, SOLUTION INTRAMUSCULAR; INTRAVENOUS ONCE
Status: COMPLETED | OUTPATIENT
Start: 2025-07-16 | End: 2025-07-16

## 2025-07-16 RX ADMIN — IOHEXOL 75 ML: 350 INJECTION, SOLUTION INTRAVENOUS at 20:40

## 2025-07-16 RX ADMIN — KETOROLAC TROMETHAMINE 15 MG: 30 INJECTION, SOLUTION INTRAMUSCULAR; INTRAVENOUS at 22:09

## 2025-07-17 LAB — B BURGDOR IGG+IGM SER QL IA: NEGATIVE

## 2025-07-17 NOTE — DISCHARGE INSTRUCTIONS
Follow-up with neurology for your headache, neck pain.  You can also continue to discuss with your primary care doctor.  Take tylenol or motrin as needed for pain.

## 2025-07-17 NOTE — ED PROVIDER NOTES
Time reflects when diagnosis was documented in both MDM as applicable and the Disposition within this note       Time User Action Codes Description Comment    7/16/2025 10:00 PM Ileana Kay Add [M54.2] Neck pain     7/16/2025 10:00 PM Ileana Kay Add [R51.9] Headache           ED Disposition       ED Disposition   Discharge    Condition   Stable    Date/Time   Wed Jul 16, 2025 10:00 PM    Comment   Cherrymaria dolores Oakes discharge to home/self care.                   Assessment & Plan       Medical Decision Making  DDx: vertebral dissection, electrolyte abnormality, muscle skeletal neck pain  FN work up started in [facility]. Patient has a non-focal neuro exam. Given neck and head pain plan to check CTA head and neck. No red flag symptoms of headache. Patient normotensive. Offered pain medication and declines.   Blood work: No leukocytosis, no anemia.  No YAW. No electrolyte abnormality. LFT WNL.  No chest pain during exam, however an EKG and troponin were run by RN staff. Heart score of 0.   No acute findings on work up today. Following toradol reports improvement to pain. Patient stable for follow up to neurology for headaches, and her PCP.   Reviewed reasons to return to ed.  Patient verbalized understanding of diagnosis and agreement with discharge plan of care as well as understanding of reasons to return to ed.      Amount and/or Complexity of Data Reviewed  Labs: ordered.  Radiology: ordered.    Risk  Prescription drug management.             Medications   iohexol (OMNIPAQUE) 350 MG/ML injection (MULTI-DOSE) 100 mL (75 mL Intravenous Given 7/16/25 2040)   ketorolac (TORADOL) injection 15 mg (15 mg Intravenous Given 7/16/25 2209)       ED Risk Strat Scores      HEART Risk Score      Flowsheet Row Most Recent Value   Heart Score Risk Calculator    History 0 Filed at: 07/16/2025 2102   ECG 0 Filed at: 07/16/2025 2102   Age 0 Filed at: 07/16/2025 2102   Risk Factors 0 Filed at: 07/16/2025 2102   Troponin 0 Filed at:  "07/16/2025 2102   HEART Score 0 Filed at: 07/16/2025 2102                      No data recorded        SBIRT 22yo+      Flowsheet Row Most Recent Value   Initial Alcohol Screen: US AUDIT-C     1. How often do you have a drink containing alcohol? 0 Filed at: 07/16/2025 2016   2. How many drinks containing alcohol do you have on a typical day you are drinking?  0 Filed at: 07/16/2025 2016   3b. FEMALE Any Age, or MALE 65+: How often do you have 4 or more drinks on one occassion? 0 Filed at: 07/16/2025 2016   Audit-C Score 0 Filed at: 07/16/2025 2016   YESSENIA: How many times in the past year have you...    Used an illegal drug or used a prescription medication for non-medical reasons? Never Filed at: 07/16/2025 2016                            History of Present Illness       Chief Complaint   Patient presents with    Neck Pain     Head/neck pain intermittently two days ago. Same thing happened 2 months ago.  When pt bends over she gets a swooshing pain/sensation. Denies chest pain/sob. Pt c/o of a dizzy spell when this happened, denies blurry vision.       Past Medical History[1]   Past Surgical History[2]   Family History[3]   Social History[4]   E-Cigarette/Vaping    E-Cigarette Use Current Every Day User       E-Cigarette/Vaping Substances    Nicotine Yes     THC No     CBD No     Flavoring No     Other No     Unknown No       I have reviewed and agree with the history as documented.     Patient is a 31 yo F arriving for evaluation of head/neck pain. Patient states her head pain is at the base of her head, top of her neck. Patient states she had a similar episode two months ago, and has had intermittent issues since. Patient states she has pain that \"wooshes\" when she leans forward. Unable to reproduce pain with movement, or palpation. Patient denies rashes. Patient states yesterday and today pain intensified gradually prompting her to come to the ED. Patient states she take naproxen. Patient rating pain a 3/10. " Patient denies numbness or tingling in distal extremities. Patient denies weakness in distal extremities. Denies loss of hearing. Patient denies blurred/double vision. Denies blurred or double vision with neck movement. Patient also reports dizziness when she changes positions. Denies cp/sob/abdominal pain. No neck stiffiness, or rashes.         Review of Systems   Constitutional: Negative.    HENT: Negative.     Eyes: Negative.    Respiratory: Negative.     Cardiovascular: Negative.    Gastrointestinal: Negative.    Endocrine: Negative.    Genitourinary: Negative.    Musculoskeletal:  Positive for neck pain. Negative for neck stiffness.   Skin: Negative.    Allergic/Immunologic: Negative.    Neurological:  Positive for dizziness and light-headedness.   Hematological: Negative.    Psychiatric/Behavioral: Negative.     All other systems reviewed and are negative.          Objective       ED Triage Vitals   Temperature Pulse Blood Pressure Respirations SpO2 Patient Position - Orthostatic VS   07/16/25 1655 07/16/25 1655 07/16/25 1655 07/16/25 1655 07/16/25 1655 07/16/25 2211   98.8 °F (37.1 °C) (!) 123 120/96 20 99 % Lying      Temp Source Heart Rate Source BP Location FiO2 (%) Pain Score    07/16/25 1655 07/16/25 1655 07/16/25 2211 -- 07/16/25 2209    Oral Monitor Left arm  5      Vitals      Date and Time Temp Pulse SpO2 Resp BP Pain Score FACES Pain Rating User   07/16/25 2240 -- -- -- -- -- 5 --    07/16/25 2215 -- -- -- 18 108/52 -- --    07/16/25 2211 -- 75 100 % 20 108/52 -- --    07/16/25 2209 -- -- -- -- -- 5 --    07/16/25 2115 -- 68 99 % 18 111/62 -- --    07/16/25 2100 -- 69 100 % 18 125/75 -- --    07/16/25 2054 -- 83 100 % 18 125/75 -- --    07/16/25 2051 -- -- -- 18 -- -- --    07/16/25 1655 98.8 °F (37.1 °C) 123 99 % 20 120/96 -- -- LK            Physical Exam  Vitals and nursing note reviewed.   Constitutional:       Appearance: Normal appearance. She is normal weight.   HENT:       Head: Normocephalic.      Right Ear: External ear normal.      Left Ear: External ear normal.      Nose: Nose normal.      Mouth/Throat:      Mouth: Mucous membranes are moist.      Pharynx: Oropharynx is clear.     Eyes:      Extraocular Movements: Extraocular movements intact.      Conjunctiva/sclera: Conjunctivae normal.      Pupils: Pupils are equal, round, and reactive to light.       Cardiovascular:      Rate and Rhythm: Normal rate and regular rhythm.      Pulses: Normal pulses.      Heart sounds: Normal heart sounds.   Pulmonary:      Effort: Pulmonary effort is normal.      Breath sounds: Normal breath sounds.   Abdominal:      General: Abdomen is flat.      Palpations: Abdomen is soft.     Musculoskeletal:         General: Normal range of motion.      Cervical back: Normal range of motion and neck supple.     Skin:     General: Skin is warm.      Capillary Refill: Capillary refill takes less than 2 seconds.     Neurological:      General: No focal deficit present.      Mental Status: She is alert and oriented to person, place, and time. Mental status is at baseline.      GCS: GCS eye subscore is 4. GCS verbal subscore is 5. GCS motor subscore is 6.      Cranial Nerves: Cranial nerves 2-12 are intact. No cranial nerve deficit.      Sensory: Sensation is intact. No sensory deficit.      Motor: Motor function is intact. No weakness or seizure activity.      Coordination: Coordination is intact. Coordination normal. Finger-Nose-Finger Test and Heel to Shin Test normal.      Gait: Gait is intact. Gait normal.      Comments: 5/5 upper extremity strength, no numbness or tingling   5/5 lower extremity strength, no numbness or tingling    Psychiatric:         Mood and Affect: Mood normal.         Behavior: Behavior normal.         Thought Content: Thought content normal.         Judgment: Judgment normal.         Results Reviewed       Procedure Component Value Units Date/Time    HS Troponin I 2hr [[id number]]  Collected: 07/16/25 2024    Lab Status: Final result Specimen: Blood from Arm, Right Updated: 07/16/25 2049     hs TnI 2hr <2 ng/L      Delta 2hr hsTnI --    POCT pregnancy, urine [[id number]]  (Normal) Collected: 07/16/25 2022    Lab Status: Edited Result - FINAL Updated: 07/16/25 2022     EXT Preg Test, Ur Negative     Control Valid    LYME TOTAL AB W REFLEX TO IGM/IGG [[id number]] Collected: 07/16/25 2018    Lab Status: In process Specimen: Blood from Arm, Right Updated: 07/16/25 2022    Narrative:      The following orders were created for panel order LYME TOTAL AB W REFLEX TO IGM/IGG.  Procedure                               Abnormality         Status                     ---------                               -----------         ------                     Lyme Total AB W Reflex t...[[id number]]                      In process                   Please view results for these tests on the individual orders.    Lyme Total AB W Reflex to IGM/IGG [[id number]] Collected: 07/16/25 2018    Lab Status: In process Specimen: Blood from Arm, Right Updated: 07/16/25 2022    HS Troponin 0hr (reflex protocol) [[id number]]  (Normal) Collected: 07/16/25 1700    Lab Status: Final result Specimen: Blood from Arm, Right Updated: 07/16/25 1729     hs TnI 0hr <2 ng/L     Comprehensive metabolic panel [[id number]]  (Abnormal) Collected: 07/16/25 1700    Lab Status: Final result Specimen: Blood from Arm, Right Updated: 07/16/25 1725     Sodium 136 mmol/L      Potassium 3.8 mmol/L      Chloride 106 mmol/L      CO2 22 mmol/L      ANION GAP 8 mmol/L      BUN 11 mg/dL      Creatinine 0.52 mg/dL      Glucose 96 mg/dL      Calcium 9.7 mg/dL      AST 19 U/L      ALT 29 U/L      Alkaline Phosphatase 39 U/L      Total Protein 7.2 g/dL      Albumin 4.5 g/dL      Total Bilirubin 0.58 mg/dL      eGFR 127 ml/min/1.73sq m     Narrative:      [employer] Kid[employer] guidelines for Chronic Kidney Disease (CKD):     Stage 1 with normal or high  GFR (GFR > 90 mL/min/1.73 square meters)    Stage 2 Mild CKD (GFR = 60-89 mL/min/1.73 square meters)    Stage 3A Moderate CKD (GFR = 45-59 mL/min/1.73 square meters)    Stage 3B Moderate CKD (GFR = 30-44 mL/min/1.73 square meters)    Stage 4 Severe CKD (GFR = 15-29 mL/min/1.73 square meters)    Stage 5 End Stage CKD (GFR <15 mL/min/1.73 square meters)  Note: GFR calculation is accurate only with a steady state creatinine    CBC and differential [[id number]] Collected: 07/16/25 1700    Lab Status: Final result Specimen: Blood from Arm, Right Updated: 07/16/25 1707     WBC 8.62 Thousand/uL      RBC 4.73 Million/uL      Hemoglobin 14.7 g/dL      Hematocrit 44.7 %      MCV 95 fL      MCH 31.1 pg      MCHC 32.9 g/dL      RDW 12.0 %      MPV 10.3 fL      Platelets 246 Thousands/uL      nRBC 0 /100 WBCs      Segmented % 66 %      Immature Grans % 0 %      Lymphocytes % 26 %      Monocytes % 6 %      Eosinophils Relative 1 %      Basophils Relative 1 %      Absolute Neutrophils 5.79 Thousands/µL      Absolute Immature Grans 0.03 Thousand/uL      Absolute Lymphocytes 2.22 Thousands/µL      Absolute Monocytes 0.48 Thousand/µL      Eosinophils Absolute 0.05 Thousand/µL      Basophils Absolute 0.05 Thousands/µL             CTA head and neck with and without contrast   Final Interpretation by Sherry Jones MD (07/16 2140)      CT Brain:  No acute intracranial abnormality.      CT Angiography:  Unremarkable CTA neck and brain.                  Workstation performed: [id number]             ECG 12 Lead Documentation Only    Date/Time: 7/16/2025 8:12 PM    Performed by: JUDITH Constantino  Authorized by: JUDITH Constantino    Indications / Diagnosis:  Neck pain  ECG reviewed by me, the ED Provider: yes    Patient location:  ED  Interpretation:     Interpretation: normal    Rate:     ECG rate:  97    ECG rate assessment: normal    Rhythm:     Rhythm: sinus rhythm    Ectopy:     Ectopy: none    QRS:     QRS axis:   Normal  Conduction:     Conduction: normal    ST segments:     ST segments:  Normal  T waves:     T waves: normal        ED Medication and Procedure Management   Prior to Admission Medications   Prescriptions Last Dose Informant Patient Reported? Taking?   naproxen (Naprosyn) 500 mg tablet   No No   Sig: Take 1 tablet (500 mg total) by mouth 2 (two) times a day with meals      Facility-Administered Medications: None     Discharge Medication List as of 7/16/2025 10:33 PM        CONTINUE these medications which have NOT CHANGED    Details   naproxen (Naprosyn) 500 mg tablet Take 1 tablet (500 mg total) by mouth 2 (two) times a day with meals, Starting Fri 6/13/2025, Normal             ED SEPSIS DOCUMENTATION   Time reflects when diagnosis was documented in both MDM as applicable and the Disposition within this note       Time User Action Codes Description Comment    7/16/2025 10:00 PM Ileana Kay [M54.2] Neck pain     7/16/2025 10:00 PM Ileana Kay [R51.9] Headache                      [1]   Past Medical History:  Diagnosis Date    ADHD     Depression     Psychiatric disorder    [2]   Past Surgical History:  Procedure Laterality Date    BREAST MASS EXCISION Left 2015    FOOT SURGERY Bilateral    [3] No family history on file.  [4]   Social History  Tobacco Use    Smoking status: Never    Smokeless tobacco: Current   Vaping Use    Vaping status: Every Day    Substances: Nicotine   Substance Use Topics    Alcohol use: Not Currently    Drug use: Never        JUDITH Constantino  07/16/25 2858

## 2025-07-18 LAB
ATRIAL RATE: 97 BPM
P AXIS: 71 DEGREES
PR INTERVAL: 122 MS
QRS AXIS: 71 DEGREES
QRSD INTERVAL: 84 MS
QT INTERVAL: 326 MS
QTC INTERVAL: 415 MS
T WAVE AXIS: 44 DEGREES
VENTRICULAR RATE: 97 BPM

## 2025-07-18 PROCEDURE — 93010 ELECTROCARDIOGRAM REPORT: CPT | Performed by: INTERNAL MEDICINE

## 2025-07-21 ENCOUNTER — TELEPHONE (OUTPATIENT)
Dept: NEUROLOGY | Facility: CLINIC | Age: 30
End: 2025-07-21

## 2025-07-21 NOTE — PATIENT INSTRUCTIONS
Additional Testing:   -Blood work to complete at your convenience  -I am recommending further Neurodiagnostic workup at this time: MRI Brain ordered    Headache Calendar  Please maintain a headache calendar  Consider using phone applications such as Migraine Maxwell or Migraine Diary    Headache/migraine treatment:     Rescue medications (for immediate treatment of a headache):   It is ok to take ibuprofen, acetaminophen or naproxen (Advil, Tylenol,  Aleve, Excedrin) if they help your headaches you should limit these to No more than 3 times a week to avoid medication overuse/rebound headaches.     For your more moderate to severe migraines take this medication early:  [x]Start Maxalt (rizatriptan) 10mg tabs - take one at the onset of headache. May repeat one time after 2 hours if pain has not resolved.     Prescription preventive medications for headaches/migraines   (To take every day to help prevent headaches - not to take at the time of headache):  [x]Start amitriptyline 10mg nightly x 1 week.  Then increase by 10mg weekly until you reach good headache control or you reach a max dosage of 50mg nightly.  Remain on the lowest effective dose      *Typically these types of medications take time until you see the benefit, although some may see improvement in days, often it may take weeks, especially if the medication is being titrated up to a beneficial level. Please contact us if there are any concerns or questions regarding the medication.     Rescue: You were given dexamethasone and toradol in the office today for your current head pain and to help break your current headache cycle.    Lifestyle Recommendations:  [x] SLEEP - Maintain a regular sleep schedule: Adults need at least 7-8 hours of uninterrupted a night. Maintain good sleep hygiene:  Going to bed and waking up at consistent times, avoiding excessive daytime naps, avoiding caffeinated beverages in the evening, avoid excessive stimulation in the evening and  generally using bed primarily for sleeping.  One hour before bedtime would recommend turning lights down lower, decreasing your activity (may read quietly, listen to music at a low volume). When you get into bed, should eliminate all technology (no texting, emailing, playing with your phone, iPad or tablet in bed).  [x] HYDRATION - Maintain good hydration.  Drink  2L of fluid a day (4 typical small water bottles)  [x] DIET - Maintain good nutrition. In particular don't skip meals and try and eat healthy balanced meals regularly.  [x] TRIGGERS - Look for other triggers and avoid them: Limit caffeine to 1-2 cups a day or less. Avoid dietary triggers that you have noticed bring on your headaches (this could include aged cheese, peanuts, MSG, aspartame and nitrates).  [x] EXERCISE - physical exercise as we all know is good for you in many ways, and not only is good for your heart, but also is beneficial for your mental health, cognitive health and  chronic pain/headaches. I would encourage at the least 5 days of physical exercise weekly for at least 30 minutes.     Education and Follow-up  [x] Please call with any questions or concerns. Of course if any new concerning symptoms go to the emergency department.  [x] Follow up 2 months or sooner if needed

## 2025-07-21 NOTE — PROGRESS NOTES
Name: Cherry Oakes      : 1995      MRN: [id number]  Encounter Provider: JUDITH Dunlap  Encounter Date: 2025   Encounter department: [facility]  :  Assessment & Plan  NDPH (new daily persistent headache)  She has a history of migraines which started when she was a young teen.  She would experience an aura with these headaches at times.  These are very infrequent, with her last migraine occurring around January.  She is here today to discuss a new headache phenomenon which started a couple months ago.  She describes it as a persistent daily headache that does not go away and does not respond to over the counter medication.  She received toradol in the ED and she does not believe it was effective for her.  She describes occipital pain bilaterally which sometimes goes into the top part of her neck but she denies any significant neck pain.  This pain increases with bending over, standing back up, and rapid head movements.  She has pain and pressure in her ears bilaterally and notes some seasonal allergies which she feels is new for her.  She was treated with an abx for a possible ear infection but the symptoms did not go away.  She does feel that her hearing is dampened in both of her ears with the pressure.  She denies tinnitus or pulsatile tinnitus but does describe a throbbing in the back of her head when she bends over.  No visual disturbances or auras with these headaches. The pain is always there but position changes make the headaches worse denies particular time of the day, however.  She has been nauseated more frequently than before and has even vomited.  She was in the ER on 2025. CTA of the head and neck was ordered which was unremarkable. She is unaware of any triggers in particular but they are made worse with stress, exercise, position changes. She is concerned that she might have an autoimmune disorder or an inflammatory process that might be  triggering the headaches. She does endorse having significant anxiety and depression.  Not currently stable but denies any intent to harm herself or anyone else.  She was going to see psychiatry previously but it was too costly.  She continues to follow with her primary.  She sometimes has mild tenderness to the touch in the occipital region with the pain but nothing severe.  Denies any trauma to her head or neck.  No pain, numbness, tingling, or weakness down into her arms or hands. Her symptoms sound consistent with chronic tension type headaches or possibly occipital neuralgia.  I would like to try to break her current headache cycle and start a preventative medication.  Can consider trigger point injections to the occipital region if symptoms persist to further support the diagnosis of occipital neuralgia.  Workup:  7/16/25 CTA H&N: unremarkable  Additional testing:  Due to increased frequency and severity of headaches and migraines I recommend further evaluation with MRI brain without contrast to rule out structural or treatable causes of symptoms   Lab work ordered for patient to complete to rule out any treatable causes of their symptoms  Preventative:  We discussed headache hygiene and lifestyle factors that may improve headaches  Start amitriptyline 10mg nightly x 1 week.  Then increase by 10mg weekly until you reach good headache control or you reach a max dosage of 50mg nightly.  Remain on the lowest effective dose    Currently on through other providers: none  Past/ failed/contraindicated: propranolol, wellbutrin, effexor  Future options: Topamax, CGRP med, Botox  Acute:  Discussed not taking over-the-counter or prescription pain medications more than 3 days per week to prevent medication overuse/rebound headache  Start Maxalt 10mg at the earliest onset of a migraine.  May repeat again in 2 hours if not completely headache free. No more than 2 tabs in 24 hours  Currently on through other providers:  none  Past/ failed/contraindicated: tylenol, ibuprofen  Future options:  Imitrex, Ubrelvy, Nurtec, Zavzpret, Reyvow, Trudhesa  Rescue:  Toradol and decadron IM injections given in office today for current headache and to help break her headache cycle     Orders:    Ambulatory Referral to Neurology    MRI brain without contrast; Future    ketorolac (TORADOL) injection 30 mg    CBC and differential; Future    Comprehensive metabolic panel; Future    C-reactive protein; Future    Sedimentation rate, automated; Future    Iron Panel (Includes Ferritin, Iron Sat%, Iron, and TIBC); Future    TSH + Free T4; Future    Vitamin D 25 hydroxy; Future    DAISY Screen w/Reflex Cascade; Future    dexamethasone (DECADRON) injection 4 mg    Chronic migraine without aura without status migrainosus, not intractable    Orders:    rizatriptan (Maxalt) 10 mg tablet; 1 tab at onset of migraine; may repeat again in 2 hours.  No more than 2 doses in 24 hours.    amitriptyline (ELAVIL) 10 mg tablet; start 10mg at bedtime. Increase by 10mg each week until good effect on headaches/pain or reach 50mg daily          History of Present Illness     We had the pleasure of evaluating Cherry in neurological consultation today. She is a 30 y.o. year-old female who presents today for evaluation of headaches.     She started with a new headache phenomenon a couple months ago. She describes it as a persistent daily headache that does not go away and does not respond to over the counter medication.  She received toradol in the ED and she does not believe it was effective for her.  She describes occipital pain bilaterally which sometimes goes into the top part of her neck but she denies any significant neck pain.  This pain increases with bending over, standing back up, and rapid head movements.  She has pain and pressure in her ears bilaterally and notes some seasonal allergies which she feels is new for her.  She was treated with an abx for a possible ear  "infection but the symptoms did not go away.  She does feel that her hearing is dampened in both of her ears with the pressure.  She denies tinnitus or pulsatile tinnitus but does describe a throbbing in the back of her head when she bends over.  No visual disturbances or auras with these headaches. The pain is always there but position changes make the headaches worse denies particular time of the day, however.  She has been nauseated more frequently than before and has even vomited.  She was in the ER on 7/16/2025. CTA of the head and neck was ordered which was unremarkable. She is unaware of any triggers in particular but they are made worse with stress, exercise, position changes. She is concerned that she might have an autoimmune disorder or an inflammatory process that might be triggering the headaches. She does endorse having significant anxiety and depression.  Not currently stable but denies any intent to harm herself or anyone else.  She was going to see psychiatry previously but it was too costly.  She continues to follow with her primary.  She sometimes has mild tenderness to the touch in the occipital region with the pain but nothing severe.  Denies any trauma to her head or neck.  No pain, numbness, tingling, or weakness down into her arms or hands.      She has a history of \"hemiplegic migraines\" where she will have numbness and tingling prior to the headache coming on.  When she was younger, she did experience an aura prior to her headaches but has not experienced this at all recently. These are very rare for her.  January was the last time she experienced one of these past.  She would use naproxen in the past but this was ineffective.      Headaches started at what age? 12 years old  How often do the headaches occur? Continuous headache for the past 9 days  What time of the day do the headaches start? No particular time of day   How long do the headaches last? Multiple days   Are you ever headache " free? No    Aura? No not with these headaches     Last eye exam: 9 months ago     Where is your headache located and pain quality? Pain at the base of her skull maybe a tad into her neck.  Throbbing, pulsing pain    What is the intensity of pain? Average: 3-4/10, worst 8/10    Associated symptoms:   [x] Nausea       [x] Vomiting        [] Diarrhea  [] Insomnia    [x] Stiff or sore neck (a little bit into the neck)  [] Problems with concentration  [] Photophobia     []Phonophobia      [] Osmophobia  [] Blurred vision   [] Prefer quiet, dark room  [x] Light-headed or dizzy     [] Tinnitus   [] Hands or feet tingle or feel numb/paresthesias    [] Ptosis      [] Facial droop  [] Lacrimation  [] Nasal congestion/rhinorrhea   [] Flushing of face    Things that make the headache worse? Exercise, stress, position changes    Headache triggers:  none that she is aware of    Have you seen someone else for headaches or pain? No, just ED  Have you had trigger point injection performed and how often? No  Have you had Botox injection performed and how often? No   Have you had epidural injections or transforaminal injections performed? No  Are you current pregnant or planning on getting pregnant? No, not sexually active  Have you ever had any Brain imaging? Yes    LIFESTYLE  Sleep   Averages: 7 hours per night  Problems falling asleep?: No  Problems staying asleep?: No  Do you snore while asleep? No  Do you wake up with headaches? Headaches always there   Ever evaluated for sleep apnea? No    Pertinent family history:  Family history of headaches: maternal grandmother   Any family history of aneurysms - No    Pertinent social history:  Work:    Lives with: boyfriend  Illicit Drugs: denies  Alcohol/tobacco: Denies alcohol use, Denies tobacco use   Caffeine: 3 monster energy drinks per day  Water: a little less than 48oz per day  Physical activity: job is physically demanding   Mood: reports depression and anxiety.   She reports wanting to establish with psychiatry but cannot afford it.  She reports that she is stable.  Denies wanting to act on harming herself or anyone else.     What medications do you take or have you taken for your headaches?:    ABORTIVE:    OTC medications:  Prescription: none  Medications from other providers: none  Past/failed/contraindicated: none    PREVENTIVE:   OTC medications: none  Prescription: none  Medications from other providers: naproxen  Past/failed/contraindicated: propranolol, effexor, wellbutrin     Review of Systems   Constitutional:  Negative for appetite change, fatigue and fever.   HENT: Negative.  Negative for hearing loss, tinnitus, trouble swallowing and voice change.         Patient states since these HA started, there has been pressure in the ear and pain in the jaw.    Eyes: Negative.  Negative for photophobia, pain and visual disturbance.   Respiratory: Negative.  Negative for shortness of breath.    Cardiovascular: Negative.  Negative for palpitations.   Gastrointestinal:  Positive for nausea and vomiting.        Patient states having nausea and vomiting.    Endocrine: Negative.  Negative for cold intolerance.   Genitourinary: Negative.  Negative for dysuria, frequency and urgency.   Musculoskeletal:  Positive for neck pain. Negative for back pain, gait problem, myalgias and neck stiffness.        Patient states having slight neck pain with HA.    Skin: Negative.  Negative for rash.   Allergic/Immunologic: Negative.    Neurological:  Positive for headaches. Negative for dizziness, tremors, seizures, syncope, facial asymmetry, speech difficulty, weakness, light-headedness and numbness.        Patient states having HA for about five to six months ago, everyday. Per patient HA were better when laying down, bending down would make them worse. Patient states HA are always located in the back of then head. Patient states having dizziness, numbness and tingling at night when sleeping.     Hematological: Negative.  Does not bruise/bleed easily.   Psychiatric/Behavioral: Negative.  Negative for confusion, hallucinations and sleep disturbance.      I have personally reviewed the MA's review of systems and made changes as necessary.    Medications Ordered Prior to Encounter[1]   Social History[2]     Objective   /88 (BP Location: Right arm, Patient Position: Sitting, Cuff Size: Large)   Pulse (!) 120   Wt 73 kg (161 lb)   BMI 26.79 kg/m²     Physical Exam  Vitals reviewed.   Constitutional:       General: She is not in acute distress.  HENT:      Head: Normocephalic and atraumatic.      Nose: Nose normal.      Mouth/Throat:      Mouth: Mucous membranes are moist.     Eyes:      General: Lids are normal.      Extraocular Movements: Extraocular movements intact.      Pupils: Pupils are equal, round, and reactive to light.     Pulmonary:      Effort: No respiratory distress.     Skin:     General: Skin is warm and dry.     Neurological:      Coordination: Romberg sign negative.     Psychiatric:         Mood and Affect: Affect normal.         Speech: Speech normal.      Comments: Tearful at times       Neurological Exam  Mental Status  Awake, alert and oriented to person, place and time. Speech is normal. Language is fluent with no aphasia. Attention and concentration are normal.    Cranial Nerves  CN II: Visual acuity is normal. Visual fields full to confrontation.  CN III, IV, VI: Extraocular movements intact bilaterally. Normal lids and orbits bilaterally. Pupils equal round and reactive to light bilaterally.  CN V: Facial sensation is normal.  CN VII: Full and symmetric facial movement.  CN VIII: Hearing is normal.  CN IX, X: Palate elevates symmetrically  CN XI: Shoulder shrug strength is normal.  CN XII: Tongue midline without atrophy or fasciculations.    Motor  Normal muscle bulk throughout.                                               Right                     Left  Deltoid                                    5                          5   Biceps                                   5                          5   Triceps                                  5                          5   Iliopsoas                               5                          5   Quadriceps                           5                          5   Hamstring                             5                          5  Ankle dorsiflexor                   5                          5    Sensory  Light touch is normal in upper and lower extremities.     Coordination  Right: Finger-to-nose normal. Rapid alternating movement normal.Left: Finger-to-nose normal. Rapid alternating movement normal.    Gait  Casual gait is normal including stance, stride, and arm swing. Romberg is absent.    Labs:  Lab Results   Component Value Date    WBC 8.62 07/16/2025    HGB 14.7 07/16/2025    HCT 44.7 07/16/2025    MCV 95 07/16/2025     07/16/2025     Lab Results   Component Value Date    SODIUM 136 07/16/2025    K 3.8 07/16/2025     07/16/2025    CO2 22 07/16/2025    AGAP 8 07/16/2025    BUN 11 07/16/2025    CREATININE 0.52 (L) 07/16/2025    GLUC 96 07/16/2025    CALCIUM 9.7 07/16/2025    AST 19 07/16/2025    ALT 29 07/16/2025    ALKPHOS 39 07/16/2025    TP 7.2 07/16/2025    TBILI 0.58 07/16/2025    EGFR 127 07/16/2025 7/16/25 Lyme: negative    Radiology Results Review:   7/16/25 CTA H&N: CT Brain:  No acute intracranial abnormality. CT Angiography:  Unremarkable CTA neck and brain.     Administrative Statements   I have spent a total time of 55 minutes in caring for this patient on the day of the visit/encounter including Diagnostic results, Prognosis, Risks and benefits of tx options, Instructions for management, Patient and family education, Importance of tx compliance, Risk factor reductions, Impressions, Counseling / Coordination of care, Documenting in the medical record, Reviewing/placing orders in the medical record (including  tests, medications, and/or procedures), and Obtaining or reviewing history  .         [1]   Current Outpatient Medications on File Prior to Visit   Medication Sig Dispense Refill    amphetamine-dextroamphetamine (ADDERALL) 20 mg tablet Take 1 tablet by mouth 3 (three) times a day      cyclobenzaprine (FLEXERIL) 5 mg tablet Take 5 mg by mouth Three times daily as needed      LORazepam (ATIVAN) 0.5 mg tablet Take 0.5 mg by mouth daily as needed      naproxen (Naprosyn) 500 mg tablet Take 1 tablet (500 mg total) by mouth 2 (two) times a day with meals 30 tablet 0    traZODone (DESYREL) 150 mg tablet Take 150 mg by mouth daily      [DISCONTINUED] buPROPion (WELLBUTRIN XL) 150 mg 24 hr tablet Take 300 mg by mouth daily (Patient not taking: Reported on 7/22/2025)       No current facility-administered medications on file prior to visit.   [2]   Social History  Tobacco Use    Smoking status: Never    Smokeless tobacco: Current   Vaping Use    Vaping status: Every Day    Substances: Nicotine   Substance and Sexual Activity    Alcohol use: Not Currently    Drug use: Never    Sexual activity: Yes     Partners: Male

## 2025-07-22 ENCOUNTER — OFFICE VISIT (OUTPATIENT)
Dept: NEUROLOGY | Facility: CLINIC | Age: 30
End: 2025-07-22
Payer: COMMERCIAL

## 2025-07-22 VITALS
SYSTOLIC BLOOD PRESSURE: 140 MMHG | WEIGHT: 161 LBS | DIASTOLIC BLOOD PRESSURE: 88 MMHG | BODY MASS INDEX: 26.79 KG/M2 | HEART RATE: 120 BPM

## 2025-07-22 DIAGNOSIS — G43.709 CHRONIC MIGRAINE WITHOUT AURA WITHOUT STATUS MIGRAINOSUS, NOT INTRACTABLE: ICD-10-CM

## 2025-07-22 DIAGNOSIS — G44.52 NDPH (NEW DAILY PERSISTENT HEADACHE): Primary | ICD-10-CM

## 2025-07-22 PROCEDURE — 99204 OFFICE O/P NEW MOD 45 MIN: CPT

## 2025-07-22 PROCEDURE — 96372 THER/PROPH/DIAG INJ SC/IM: CPT

## 2025-07-22 RX ORDER — KETOROLAC TROMETHAMINE 30 MG/ML
30 INJECTION, SOLUTION INTRAMUSCULAR; INTRAVENOUS ONCE
Status: COMPLETED | OUTPATIENT
Start: 2025-07-22 | End: 2025-07-22

## 2025-07-22 RX ORDER — DEXAMETHASONE SODIUM PHOSPHATE 4 MG/ML
4 INJECTION, SOLUTION INTRA-ARTICULAR; INTRALESIONAL; INTRAMUSCULAR; INTRAVENOUS; SOFT TISSUE ONCE
Status: COMPLETED | OUTPATIENT
Start: 2025-07-22 | End: 2025-07-22

## 2025-07-22 RX ORDER — BUPROPION HYDROCHLORIDE 150 MG/1
300 TABLET ORAL DAILY
COMMUNITY
End: 2025-07-22 | Stop reason: ALTCHOICE

## 2025-07-22 RX ORDER — LORAZEPAM 0.5 MG/1
0.5 TABLET ORAL DAILY PRN
COMMUNITY
Start: 2025-07-11

## 2025-07-22 RX ORDER — DEXTROAMPHETAMINE SACCHARATE, AMPHETAMINE ASPARTATE, DEXTROAMPHETAMINE SULFATE AND AMPHETAMINE SULFATE 5; 5; 5; 5 MG/1; MG/1; MG/1; MG/1
1 TABLET ORAL 3 TIMES DAILY
COMMUNITY
Start: 2025-07-11

## 2025-07-22 RX ORDER — TRAZODONE HYDROCHLORIDE 150 MG/1
150 TABLET ORAL DAILY
COMMUNITY
Start: 2025-05-24

## 2025-07-22 RX ORDER — RIZATRIPTAN BENZOATE 10 MG/1
TABLET ORAL
Qty: 12 TABLET | Refills: 2 | Status: SHIPPED | OUTPATIENT
Start: 2025-07-22

## 2025-07-22 RX ORDER — DEXAMETHASONE SODIUM PHOSPHATE 4 MG/ML
4 INJECTION, SOLUTION INTRA-ARTICULAR; INTRALESIONAL; INTRAMUSCULAR; INTRAVENOUS; SOFT TISSUE EVERY 6 HOURS SCHEDULED
Status: DISCONTINUED | OUTPATIENT
Start: 2025-07-22 | End: 2025-07-22

## 2025-07-22 RX ORDER — CYCLOBENZAPRINE HCL 5 MG
5 TABLET ORAL 3 TIMES DAILY PRN
COMMUNITY
Start: 2025-07-16 | End: 2025-07-26

## 2025-07-22 RX ORDER — AMITRIPTYLINE HYDROCHLORIDE 10 MG/1
TABLET ORAL
Qty: 150 TABLET | Refills: 1 | Status: SHIPPED | OUTPATIENT
Start: 2025-07-22

## 2025-07-22 RX ADMIN — KETOROLAC TROMETHAMINE 30 MG: 30 INJECTION, SOLUTION INTRAMUSCULAR; INTRAVENOUS at 09:23

## 2025-07-22 RX ADMIN — DEXAMETHASONE SODIUM PHOSPHATE 4 MG: 4 INJECTION, SOLUTION INTRA-ARTICULAR; INTRALESIONAL; INTRAMUSCULAR; INTRAVENOUS; SOFT TISSUE at 09:24

## 2025-07-22 NOTE — PROGRESS NOTES
Name: Cherry Oakes      : 1995      MRN: [id number]  Encounter Provider: JUDITH Dulnap  Encounter Date: 2025   Encounter department: [facility]  :  Assessment & Plan      {Ambulatory Patient Instructions (Optional):49408}    History of Present Illness {?Quick Links Encounters * My Last Note * Last Note in Specialty * Snapshot * Since Last Visit * History :82775}  Headache     Review of Systems   Constitutional:  Negative for appetite change, fatigue and fever.   HENT: Negative.  Negative for hearing loss, tinnitus, trouble swallowing and voice change.         Patient states since these HA started, there has been pressure in the ear and pain in the jaw.    Eyes: Negative.  Negative for photophobia, pain and visual disturbance.   Respiratory: Negative.  Negative for shortness of breath.    Cardiovascular: Negative.  Negative for palpitations.   Gastrointestinal:  Positive for nausea and vomiting.        Patient states having nausea and vomiting.    Endocrine: Negative.  Negative for cold intolerance.   Genitourinary: Negative.  Negative for dysuria, frequency and urgency.   Musculoskeletal:  Positive for neck pain. Negative for back pain, gait problem, myalgias and neck stiffness.        Patient states having slight neck pain with HA.    Skin: Negative.  Negative for rash.   Allergic/Immunologic: Negative.    Neurological:  Positive for headaches. Negative for dizziness, tremors, seizures, syncope, facial asymmetry, speech difficulty, weakness, light-headedness and numbness.        Patient states having HA for about five to six months ago, everyday. Per patient HA were better when laying down, bending down would make them worse. Patient states HA are always located in the back of then head. Patient states having dizziness, numbness and tingling at night when sleeping.    Hematological: Negative.  Does not bruise/bleed easily.   Psychiatric/Behavioral: Negative.   Negative for confusion, hallucinations and sleep disturbance.     I have personally reviewed the MA's review of systems and made changes as necessary.    {Select to insert medical history sections (Optional):60708}     Objective {?Quick Links Trend Vitals * Enter New Vitals * Results Review * Timeline (Adult) * Labs * Imaging * Cardiology * Procedures * Lung Cancer Screening * Surgical eConsent :11129}  There were no vitals taken for this visit.    Physical Exam  Neurological Exam    {Radiology Results Review (Optional):62958}    {Administrative / Billing Section (Optional):60302}

## 2025-07-29 ENCOUNTER — APPOINTMENT (OUTPATIENT)
Dept: LAB | Facility: HOSPITAL | Age: 30
End: 2025-07-29
Payer: COMMERCIAL

## 2025-07-29 ENCOUNTER — PATIENT MESSAGE (OUTPATIENT)
Dept: NEUROLOGY | Facility: CLINIC | Age: 30
End: 2025-07-29

## 2025-07-29 DIAGNOSIS — G44.52 NDPH (NEW DAILY PERSISTENT HEADACHE): ICD-10-CM

## 2025-07-29 DIAGNOSIS — R53.83 FATIGUE: ICD-10-CM

## 2025-07-29 DIAGNOSIS — R20.2 PARESTHESIA: Primary | ICD-10-CM

## 2025-07-29 LAB
25(OH)D3 SERPL-MCNC: 28 NG/ML (ref 30–100)
ALBUMIN SERPL BCG-MCNC: 4.6 G/DL (ref 3.5–5)
ALP SERPL-CCNC: 44 U/L (ref 34–104)
ALT SERPL W P-5'-P-CCNC: 41 U/L (ref 7–52)
ANION GAP SERPL CALCULATED.3IONS-SCNC: 8 MMOL/L (ref 4–13)
AST SERPL W P-5'-P-CCNC: 24 U/L (ref 13–39)
BASOPHILS # BLD AUTO: 0.05 THOUSANDS/ÂΜL (ref 0–0.1)
BASOPHILS NFR BLD AUTO: 1 % (ref 0–1)
BILIRUB SERPL-MCNC: 0.45 MG/DL (ref 0.2–1)
BUN SERPL-MCNC: 9 MG/DL (ref 5–25)
CALCIUM SERPL-MCNC: 9.4 MG/DL (ref 8.4–10.2)
CHLORIDE SERPL-SCNC: 104 MMOL/L (ref 96–108)
CO2 SERPL-SCNC: 24 MMOL/L (ref 21–32)
CREAT SERPL-MCNC: 0.58 MG/DL (ref 0.6–1.3)
CRP SERPL QL: 1.4 MG/L
EOSINOPHIL # BLD AUTO: 0.08 THOUSAND/ÂΜL (ref 0–0.61)
EOSINOPHIL NFR BLD AUTO: 1 % (ref 0–6)
ERYTHROCYTE [DISTWIDTH] IN BLOOD BY AUTOMATED COUNT: 12 % (ref 11.6–15.1)
ERYTHROCYTE [SEDIMENTATION RATE] IN BLOOD: 9 MM/HOUR (ref 0–19)
FERRITIN SERPL-MCNC: 52 NG/ML (ref 30–307)
GFR SERPL CREATININE-BSD FRML MDRD: 123 ML/MIN/1.73SQ M
GLUCOSE P FAST SERPL-MCNC: 94 MG/DL (ref 65–99)
HCT VFR BLD AUTO: 42.4 % (ref 34.8–46.1)
HGB BLD-MCNC: 14.8 G/DL (ref 11.5–15.4)
IMM GRANULOCYTES # BLD AUTO: 0.02 THOUSAND/UL (ref 0–0.2)
IMM GRANULOCYTES NFR BLD AUTO: 0 % (ref 0–2)
IRON SATN MFR SERPL: 24 % (ref 15–50)
IRON SERPL-MCNC: 85 UG/DL (ref 50–212)
LYMPHOCYTES # BLD AUTO: 2.36 THOUSANDS/ÂΜL (ref 0.6–4.47)
LYMPHOCYTES NFR BLD AUTO: 35 % (ref 14–44)
MCH RBC QN AUTO: 31.9 PG (ref 26.8–34.3)
MCHC RBC AUTO-ENTMCNC: 34.9 G/DL (ref 31.4–37.4)
MCV RBC AUTO: 91 FL (ref 82–98)
MONOCYTES # BLD AUTO: 0.49 THOUSAND/ÂΜL (ref 0.17–1.22)
MONOCYTES NFR BLD AUTO: 7 % (ref 4–12)
NEUTROPHILS # BLD AUTO: 3.77 THOUSANDS/ÂΜL (ref 1.85–7.62)
NEUTS SEG NFR BLD AUTO: 56 % (ref 43–75)
NRBC BLD AUTO-RTO: 0 /100 WBCS
PLATELET # BLD AUTO: 250 THOUSANDS/UL (ref 149–390)
PMV BLD AUTO: 10.8 FL (ref 8.9–12.7)
POTASSIUM SERPL-SCNC: 3.8 MMOL/L (ref 3.5–5.3)
PROT SERPL-MCNC: 7.2 G/DL (ref 6.4–8.4)
RBC # BLD AUTO: 4.64 MILLION/UL (ref 3.81–5.12)
SODIUM SERPL-SCNC: 136 MMOL/L (ref 135–147)
T4 FREE SERPL-MCNC: 0.91 NG/DL (ref 0.61–1.12)
TIBC SERPL-MCNC: 347.2 UG/DL (ref 250–450)
TRANSFERRIN SERPL-MCNC: 248 MG/DL (ref 203–362)
TSH SERPL DL<=0.05 MIU/L-ACNC: 2.08 UIU/ML (ref 0.45–4.5)
UIBC SERPL-MCNC: 262 UG/DL (ref 155–355)
WBC # BLD AUTO: 6.77 THOUSAND/UL (ref 4.31–10.16)

## 2025-07-29 PROCEDURE — 82306 VITAMIN D 25 HYDROXY: CPT

## 2025-07-29 PROCEDURE — 84443 ASSAY THYROID STIM HORMONE: CPT

## 2025-07-29 PROCEDURE — 83550 IRON BINDING TEST: CPT

## 2025-07-29 PROCEDURE — 86225 DNA ANTIBODY NATIVE: CPT

## 2025-07-29 PROCEDURE — 36415 COLL VENOUS BLD VENIPUNCTURE: CPT

## 2025-07-29 PROCEDURE — 86140 C-REACTIVE PROTEIN: CPT

## 2025-07-29 PROCEDURE — 85652 RBC SED RATE AUTOMATED: CPT

## 2025-07-29 PROCEDURE — 82728 ASSAY OF FERRITIN: CPT

## 2025-07-29 PROCEDURE — 83540 ASSAY OF IRON: CPT

## 2025-07-29 PROCEDURE — 84439 ASSAY OF FREE THYROXINE: CPT

## 2025-07-29 PROCEDURE — 86038 ANTINUCLEAR ANTIBODIES: CPT

## 2025-07-29 PROCEDURE — 85025 COMPLETE CBC W/AUTO DIFF WBC: CPT

## 2025-07-29 PROCEDURE — 80053 COMPREHEN METABOLIC PANEL: CPT

## 2025-07-31 LAB
DSDNA IGG SERPL IA-ACNC: <0.9 IU/ML (ref ?–15)
NUCLEAR IGG SER IA-RTO: <0.09 RATIO (ref ?–1)

## 2025-08-01 ENCOUNTER — HOSPITAL ENCOUNTER (EMERGENCY)
Facility: HOSPITAL | Age: 30
Discharge: LEFT BEFORE DISCHARGE | End: 2025-08-01

## 2025-08-01 VITALS
OXYGEN SATURATION: 100 % | TEMPERATURE: 97.7 F | RESPIRATION RATE: 16 BRPM | HEART RATE: 88 BPM | DIASTOLIC BLOOD PRESSURE: 55 MMHG | SYSTOLIC BLOOD PRESSURE: 116 MMHG | WEIGHT: 158 LBS

## 2025-08-01 LAB
ANION GAP SERPL CALC-SCNC: 7 MEQ/L (ref 3–15)
BACTERIA URNS QL MICRO: ABNORMAL /HPF
BASOPHILS # BLD: 0.06 K/UL (ref 0.01–0.1)
BASOPHILS NFR BLD: 0.7 %
BILIRUB UR QL STRIP.AUTO: NEGATIVE MG/DL
BUN SERPL-MCNC: 13 MG/DL (ref 7–25)
CALCIUM SERPL-MCNC: 9.5 MG/DL (ref 8.6–10.3)
CHLORIDE SERPL-SCNC: 106 MEQ/L (ref 98–107)
CLARITY UR REFRACT.AUTO: ABNORMAL
CO2 SERPL-SCNC: 25 MEQ/L (ref 21–31)
COLOR UR AUTO: YELLOW
CREAT SERPL-MCNC: 0.6 MG/DL (ref 0.6–1.2)
DIFFERENTIAL METHOD BLD: NORMAL
EGFRCR SERPLBLD CKD-EPI 2021: >60 ML/MIN/1.73M*2
EOSINOPHIL # BLD: 0.08 K/UL (ref 0.04–0.36)
EOSINOPHIL NFR BLD: 0.9 %
ERYTHROCYTE [DISTWIDTH] IN BLOOD BY AUTOMATED COUNT: 12 % (ref 11.7–14.4)
GLUCOSE SERPL-MCNC: 127 MG/DL (ref 70–99)
GLUCOSE UR STRIP.AUTO-MCNC: NEGATIVE MG/DL
HCG UR QL: NEGATIVE
HCT VFR BLD AUTO: 41.2 % (ref 35–45)
HGB BLD-MCNC: 13.8 G/DL (ref 11.8–15.7)
HGB UR QL STRIP.AUTO: 3
HYALINE CASTS #/AREA URNS LPF: ABNORMAL /LPF
IMM GRANULOCYTES # BLD AUTO: 0.02 K/UL (ref 0–0.08)
IMM GRANULOCYTES NFR BLD AUTO: 0.2 %
KETONES UR STRIP.AUTO-MCNC: ABNORMAL MG/DL
LEUKOCYTE ESTERASE UR QL STRIP.AUTO: 2
LYMPHOCYTES # BLD: 1.92 K/UL (ref 1.2–3.5)
LYMPHOCYTES NFR BLD: 21.8 %
MCH RBC QN AUTO: 31.1 PG (ref 28–33.2)
MCHC RBC AUTO-ENTMCNC: 33.5 G/DL (ref 32.2–35.5)
MCV RBC AUTO: 92.8 FL (ref 83–98)
MONOCYTES # BLD: 0.48 K/UL (ref 0.28–0.8)
MONOCYTES NFR BLD: 5.4 %
MUCOUS THREADS URNS QL MICRO: 3 /LPF
NEUTROPHILS # BLD: 6.26 K/UL (ref 1.7–7)
NEUTS SEG NFR BLD: 71 %
NITRITE UR QL STRIP.AUTO: NEGATIVE
NRBC BLD-RTO: 0 %
PH UR STRIP.AUTO: 5.5 [PH]
PLATELET # BLD AUTO: 226 K/UL (ref 150–369)
PMV BLD AUTO: 10.5 FL (ref 9.4–12.3)
POTASSIUM SERPL-SCNC: 3.6 MEQ/L (ref 3.5–5.1)
PROT UR QL STRIP.AUTO: 1
RBC # BLD AUTO: 4.44 M/UL (ref 3.93–5.22)
RBC #/AREA URNS HPF: ABNORMAL /HPF
SODIUM SERPL-SCNC: 138 MEQ/L (ref 136–145)
SP GR UR REFRACT.AUTO: >1.035
SQUAMOUS URNS QL MICRO: ABNORMAL /HPF
UROBILINOGEN UR STRIP-ACNC: 0.2 EU/DL
WBC # BLD AUTO: 8.82 K/UL (ref 3.8–10.5)
WBC #/AREA URNS HPF: ABNORMAL /HPF

## 2025-08-01 PROCEDURE — 87086 URINE CULTURE/COLONY COUNT: CPT

## 2025-08-01 PROCEDURE — 81001 URINALYSIS AUTO W/SCOPE: CPT

## 2025-08-01 PROCEDURE — 80048 BASIC METABOLIC PNL TOTAL CA: CPT

## 2025-08-01 PROCEDURE — 85025 COMPLETE CBC W/AUTO DIFF WBC: CPT

## 2025-08-01 PROCEDURE — 36415 COLL VENOUS BLD VENIPUNCTURE: CPT

## 2025-08-01 PROCEDURE — 84703 CHORIONIC GONADOTROPIN ASSAY: CPT

## 2025-08-01 RX ORDER — DEXTROAMPHETAMINE SACCHARATE, AMPHETAMINE ASPARTATE, DEXTROAMPHETAMINE SULFATE AND AMPHETAMINE SULFATE 1.25; 1.25; 1.25; 1.25 MG/1; MG/1; MG/1; MG/1
5 TABLET ORAL
COMMUNITY

## 2025-08-01 RX ORDER — TRAZODONE HYDROCHLORIDE 150 MG/1
150 TABLET ORAL DAILY
COMMUNITY
Start: 2025-05-24

## 2025-08-01 RX ORDER — DEXTROAMPHETAMINE SACCHARATE, AMPHETAMINE ASPARTATE, DEXTROAMPHETAMINE SULFATE AND AMPHETAMINE SULFATE 5; 5; 5; 5 MG/1; MG/1; MG/1; MG/1
1 TABLET ORAL 3 TIMES DAILY
COMMUNITY
Start: 2025-01-07

## 2025-08-03 LAB
BACTERIA UR CULT: NORMAL
BACTERIA UR CULT: NORMAL

## 2025-08-12 ENCOUNTER — PATIENT MESSAGE (OUTPATIENT)
Dept: NEUROLOGY | Facility: CLINIC | Age: 30
End: 2025-08-12

## 2025-08-13 ENCOUNTER — APPOINTMENT (OUTPATIENT)
Dept: LAB | Facility: HOSPITAL | Age: 30
End: 2025-08-13
Payer: COMMERCIAL

## 2025-08-13 DIAGNOSIS — R53.83 FATIGUE: ICD-10-CM

## 2025-08-13 DIAGNOSIS — R20.2 PARESTHESIA: ICD-10-CM

## 2025-08-13 LAB
FOLATE SERPL-MCNC: 11.3 NG/ML
VIT B12 SERPL-MCNC: 658 PG/ML (ref 180–914)

## 2025-08-13 PROCEDURE — 36415 COLL VENOUS BLD VENIPUNCTURE: CPT

## 2025-08-13 PROCEDURE — 84207 ASSAY OF VITAMIN B-6: CPT

## 2025-08-13 PROCEDURE — 82746 ASSAY OF FOLIC ACID SERUM: CPT

## 2025-08-13 PROCEDURE — 82607 VITAMIN B-12: CPT

## 2025-08-16 LAB — VIT B6 SERPL-MCNC: 30.2 UG/L (ref 3.4–65.2)
